# Patient Record
Sex: FEMALE | Race: WHITE
[De-identification: names, ages, dates, MRNs, and addresses within clinical notes are randomized per-mention and may not be internally consistent; named-entity substitution may affect disease eponyms.]

---

## 2018-02-16 ENCOUNTER — HOSPITAL ENCOUNTER (EMERGENCY)
Dept: HOSPITAL 25 - ED | Age: 83
Discharge: HOME | End: 2018-02-16
Payer: MEDICARE

## 2018-02-16 VITALS — SYSTOLIC BLOOD PRESSURE: 151 MMHG | DIASTOLIC BLOOD PRESSURE: 67 MMHG

## 2018-02-16 DIAGNOSIS — Z88.5: ICD-10-CM

## 2018-02-16 DIAGNOSIS — R51: ICD-10-CM

## 2018-02-16 DIAGNOSIS — I10: Primary | ICD-10-CM

## 2018-02-16 LAB
BASOPHILS # BLD AUTO: 0 10^3/UL (ref 0–0.2)
EOSINOPHIL # BLD AUTO: 0.2 10^3/UL (ref 0–0.6)
HCT VFR BLD AUTO: 40 % (ref 35–47)
HGB BLD-MCNC: 13.1 G/DL (ref 12–16)
INR PPP/BLD: 0.9 (ref 0.77–1.02)
LYMPHOCYTES # BLD AUTO: 2.1 10^3/UL (ref 1–4.8)
MCH RBC QN AUTO: 30 PG (ref 27–31)
MCHC RBC AUTO-ENTMCNC: 33 G/DL (ref 31–36)
MCV RBC AUTO: 89 FL (ref 80–97)
MONOCYTES # BLD AUTO: 0.5 10^3/UL (ref 0–0.8)
NEUTROPHILS # BLD AUTO: 3.6 10^3/UL (ref 1.5–7.7)
NRBC # BLD AUTO: 0 10^3/UL
NRBC BLD QL AUTO: 0
PLATELET # BLD AUTO: 243 10^3/UL (ref 150–450)
RBC # BLD AUTO: 4.43 10^6/UL (ref 4–5.4)
WBC # BLD AUTO: 6.5 10^3/UL (ref 3.5–10.8)

## 2018-02-16 PROCEDURE — 85610 PROTHROMBIN TIME: CPT

## 2018-02-16 PROCEDURE — 85730 THROMBOPLASTIN TIME PARTIAL: CPT

## 2018-02-16 PROCEDURE — 80053 COMPREHEN METABOLIC PANEL: CPT

## 2018-02-16 PROCEDURE — 96375 TX/PRO/DX INJ NEW DRUG ADDON: CPT

## 2018-02-16 PROCEDURE — 99283 EMERGENCY DEPT VISIT LOW MDM: CPT

## 2018-02-16 PROCEDURE — 96365 THER/PROPH/DIAG IV INF INIT: CPT

## 2018-02-16 PROCEDURE — 70450 CT HEAD/BRAIN W/O DYE: CPT

## 2018-02-16 PROCEDURE — 36415 COLL VENOUS BLD VENIPUNCTURE: CPT

## 2018-02-16 PROCEDURE — 85025 COMPLETE CBC W/AUTO DIFF WBC: CPT

## 2018-02-16 NOTE — ED
Vipul BRITO Stephanie, scribed for Neema Tavares MD on 02/16/18 at 0211 .





Headache





- HPI Summary


HPI Summary: 


The pt is an 81 y/o F presenting to the ED with c/o HA that began this 

afternoon. Symptoms include high blood pressure. The pt denies vomiting. The HA 

is rated as a 5 in severity. The HA feels better now than at onset. 








- History Of Current Complaint


Chief Complaint: EDHeadache


Stated Complaint: HEADACHE, HIGH BP


Time Seen by Provider: 02/16/18 02:08


Hx Obtained From: Patient


Onset/Duration: Gradual Onset, Started hours ago, Still Present


Currently Pain Is: Current Pain Scale(0-10)= - 5


Timing: Constant


Aggravating Factor: Nothing


Allevating Factors: Nothing





- Allergies/Home Medications


Allergies/Adverse Reactions: 


 Allergies











Allergy/AdvReac Type Severity Reaction Status Date / Time


 


codeine Allergy  Rash Verified 02/16/18 01:43














PMH/Surg Hx/FS Hx/Imm Hx


Endocrine/Hematology History: 


   Denies: Hx Diabetes


Cardiovascular History: Reports: Hx Hypertension, Hx Valvular Heart Disease - 

mitral valve replacement


   Denies: Hx Pacemaker/ICD


GI History: Reports: Hx Gastroesophageal Reflux Disease


 History: 


   Denies: Hx Renal Disease


Musculoskeletal History: 


   Denies: Hx Osteoporosis


Sensory History: 


   Denies: Hx Hearing Aid


Psychiatric History: 


   Denies: Hx Panic Disorder





- Cancer History


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


Hx Chemotherapy: No


Hx Radiation Therapy: No


Hx Palliative Cancer Treatment: No





- Surgical History


Surgery Procedure, Year, and Place: TONSILS AS CHILD.  MITRAL VALVE REPAIR-PT 

HAS CARDS THAT STATE MRI SAFE NO METAL 2014.  TUBAL LIGATION.  BILATERAL 

CATARACT SURGERY


Infectious Disease History: No


Infectious Disease History: 


   Denies: Traveled Outside the [place] in Last 30 Days





- Family History


Known Family History: Positive: Hypertension





- Social History


Occupation: Retired


Lives: With Family


Alcohol Use: None


Substance Use Type: Reports: None


Smoking Status (MU): Never Smoked Tobacco





Review of Systems


Negative: Fever


Negative: Vomiting


Positive: Headache


All Other Systems Reviewed And Are Negative: Yes





Physical Exam





- Summary


Physical Exam Summary: 


GENERAL:  Patient is a well-developed and nourished FEMALE who is lying 

comfortable in the stretcher. Patient is not in any acute respiratory distress.


HEAD AND FACE: No signs of trauma. No ecchymosis, hematomas or skull 

depressions. No sinus tenderness.


EYES: PERRLA, EOMI x 2, No injected conjunctiva, no nystagmus.


EARS: Hearing grossly intact. Ear canals and tympanic membranes are within 

normal limits.


MOUTH: Oropharynx within normal limits.


NECK: Supple, trachea is midline, no adenopathy, no JVD, no carotid bruit, no c-

spine tenderness, neck with full ROM.


CHEST: Symmetric, no tenderness at palpation


LUNGS: Clear to auscultation bilaterally. No wheezing or crackles.


CVS: Regular rate and rhythm, S1 and S2 present, no murmurs or gallops 

appreciated.


ABDOMEN: Soft, non-tender. No signs of distention. No rebound no guarding, and 

no masses palpated. Bowel sounds are normal.


EXTREMITIES: FROM in all major joints, no edema, no cyanosis or clubbing.


NEURO: Alert and oriented x 3. No acute neurological deficits. Speech is normal 

and follows commands.


SKIN: Dry and warm








Triage Information Reviewed: Yes


Vital Signs On Initial Exam: 


 Initial Vitals











Temp Pulse Resp BP Pulse Ox


 


 97.9 F   91   16   179/85   96 


 


 02/16/18 01:40  02/16/18 01:40  02/16/18 01:40  02/16/18 01:40  02/16/18 01:40











Vital Signs Reviewed: Yes





Diagnostics





- Vital Signs


 Vital Signs











  Temp Pulse Resp BP Pulse Ox


 


 02/16/18 01:40  97.9 F  91  16  179/85  96














- Laboratory


Result Diagrams: 


 02/16/18 02:10





 02/16/18 02:10


Lab Statement: Any lab studies that have been ordered have been reviewed, and 

results considered in the medical decision making process.





- CT


  ** Brain


CT Interpretation: Positive (See Comments)


CT Interpretation Completed By: Radiologist - Lacunar infarction in the left 

internal capsule appears chronic. Comparison to prior studies is recommended.





Headache Course/Dx





- Course


Course Of Treatment: The pt is an 81 y/o F presenting to the ED with c/o HA 

that began earlier this evening with elevated BP. The pt was given Labetalol 10 

mg IV. The pt's BP has decreased, and the pt's HA is better. She will be 

dischrged home.





- Diagnoses


Provider Diagnoses: 


 Hypertension, Headache








Discharge





- Discharge Plan


Condition: Stable


Disposition: HOME


Patient Education Materials:  Migraine Headache (ED), Hypertension (ED)


Referrals: 


Parvin Bazzi MD [Primary Care Provider] - 3 Days


Additional Instructions: 


You were given Labetalol 10 mg IV in the ED. 


RETURN TO EMERGENCY DEPARTMENT FOR ANY NEW OR WORSENING SYMPTOMS 





The documentation as recorded by the Vipul mosley Stephanie accurately reflects 

the service I personally performed and the decisions made by me, Neema Tavares MD.

## 2018-02-16 NOTE — RAD
HISTORY: Headache



COMPARISONS: MRI of the brain dated October 03, 2016



TECHNIQUE: Multiple contiguous axial CT scans were obtained of the head without 

intravenous contrast. 



FINDINGS: 

HEMORRHAGE/INFARCT: There is no hemorrhage or acute infarct.

MASSES/SHIFT: There is no mass or shift.



EXTRA-AXIAL SPACES: There are no extra-axial fluid collections.

SULCI AND VENTRICLES: The sulci and ventricles are normal in size and position for the

patient's stated age.



CEREBRUM: There is a chronic lacunar infarct of the left basal ganglia.

BRAINSTEM: There are no focal parenchymal abnormalities.

CEREBELLUM: There are no focal parenchymal abnormalities.



VESSELS: The vessels are grossly normal.

PARANASAL SINUSES: The paranasal sinuses are clear.

ORBITS: The orbits are unremarkable.

BONES AND SOFT TISSUE: No bone or soft tissue abnormalities are noted.



OTHER: None



IMPRESSION: 

1.  NO ACUTE INTRACRANIAL PATHOLOGY.

2.  CHRONIC LACUNAR INFARCT OF THE LEFT BASAL GANGLIA.

## 2018-05-01 NOTE — HP
HISTORY AND PHYSICAL:

 

DATE OF SURGERY:  05/10/18

 

DATE OF OFFICE VISIT:  04/30/18

 

SURGEON:  Natasha Morris MD * (DICTATED BY MELIZA CRUZ)

 

PROCEDURE:  Left total knee arthroplasty.

 

CHIEF COMPLAINT:  Left knee pain.

 

HISTORY OF PRESENT ILLNESS:  Ms. Sneed is an 82-year-old female with 
complaints of left knee pain.  She has failed conservative management and 
elected to proceed with a left total knee arthroplasty which is scheduled for 05
/10/18 with Dr. Morris.

 

PAST MEDICAL HISTORY:

1.  Hypertension.

2.  History of a leaky heart valve.

3.  History of melanoma.

 

PAST SURGICAL HISTORY:

1.  Heart valve repair.

2.  Tonsillectomy.

3.  Cataract removal.

 

CURRENT MEDICATIONS:

1.  Evista 60 mg daily.

2.  Hydrochlorothiazide 25 mg daily.

3.  Nasonex as needed.

4.  Omeprazole 20 mg daily.

5.  Senior multivitamin.

6.  Glucosamine chondroitin.

7.  Zyrtec for allergies.

8.  Ibuprofen.

9.  Metoprolol 25 mg twice a day.

10.  Aspirin 325 every day.

11.  Valsartan 80 mg daily.

12.  Citrucel.

 

ALLERGIES:  None.

 

FAMILY HISTORY:  Parkinson's disease, cancer and coronary artery disease.

 

SOCIAL HISTORY:  She is an 82-year-old female.  She lives with her .  
She does not smoke or use drugs.  She uses occasional alcohol.

 

REVIEW OF SYSTEMS:  A complete 14-point review of systems was reviewed with the 
patient, it was all negative.  She denies history of DVT, PE, hepatitis, HIV, 
or anesthesia problems.

 

                               PHYSICAL EXAMINATION

 

GENERAL:  She is well developed, well nourished, in no acute distress.

 

VITAL SIGNS:  She stands 5 feet 3 inches tall, weighs 156 pounds.  Her blood 
pressure is 160/90 and heart rate is 64.

 

HEENT:  Normocephalic, atraumatic.

 

NECK:  Supple.  No palpable lymph nodes.

 

PULMONARY:  The lungs are clear to auscultation bilaterally.

 

CARDIO:  Regular rate and rhythm.  Strong S1 and S2.

 

ABDOMEN:  Soft, nontender, and nondistended.

 

NEUROLOGICAL:  She is alert and oriented x3.  Cranial nerves II through XII are 
intact.

 

MUSCULOSKELETAL:  Left lower extremity, the skin is intact.  There is no open 
wounds or abrasions.  She has a moderate joint effusion and tenderness over the 
medial and lateral joint line.  Range of motion 5 to 120 degrees with 
patellofemoral crepitus.  She has 2+ dorsalis pedis pulses.  Intact sensation 
to lower extremities.  Muscle group strengths are intact at 5/5.

 

 ASSESSMENT AND PLAN:  Ms. Sneed is an 82-year-old female with end-stage 
osteoarthritis of the left knee.  She has failed conservative management and 
elected to proceed with a left total knee arthroplasty which is scheduled for 05
/10/18 with Dr. Morris.  Dr. Morris discussed the risks and benefits of surgery 
at today's visit and all of her questions were answered.  She will follow up 
with Dr. Morris 2 weeks after the surgery.

 

 ____________________________________ MELIZA CRUZ

 

[id number]/CPS #: [id number]

MTDD

## 2018-05-10 ENCOUNTER — HOSPITAL ENCOUNTER (INPATIENT)
Dept: HOSPITAL 25 - AA | Age: 83
LOS: 2 days | Discharge: HOME HEALTH | DRG: 470 | End: 2018-05-12
Attending: ORTHOPAEDIC SURGERY | Admitting: ORTHOPAEDIC SURGERY
Payer: MEDICARE

## 2018-05-10 DIAGNOSIS — M81.0: ICD-10-CM

## 2018-05-10 DIAGNOSIS — K21.9: ICD-10-CM

## 2018-05-10 DIAGNOSIS — Z88.5: ICD-10-CM

## 2018-05-10 DIAGNOSIS — Z98.49: ICD-10-CM

## 2018-05-10 DIAGNOSIS — I27.20: ICD-10-CM

## 2018-05-10 DIAGNOSIS — M25.762: ICD-10-CM

## 2018-05-10 DIAGNOSIS — Z72.89: ICD-10-CM

## 2018-05-10 DIAGNOSIS — M85.862: ICD-10-CM

## 2018-05-10 DIAGNOSIS — E78.00: ICD-10-CM

## 2018-05-10 DIAGNOSIS — I95.1: ICD-10-CM

## 2018-05-10 DIAGNOSIS — Z82.0: ICD-10-CM

## 2018-05-10 DIAGNOSIS — I10: ICD-10-CM

## 2018-05-10 DIAGNOSIS — Z79.82: ICD-10-CM

## 2018-05-10 DIAGNOSIS — Z95.2: ICD-10-CM

## 2018-05-10 DIAGNOSIS — M17.12: Primary | ICD-10-CM

## 2018-05-10 DIAGNOSIS — Z80.9: ICD-10-CM

## 2018-05-10 DIAGNOSIS — Z85.820: ICD-10-CM

## 2018-05-10 DIAGNOSIS — Z91.040: ICD-10-CM

## 2018-05-10 DIAGNOSIS — Z98.51: ICD-10-CM

## 2018-05-10 DIAGNOSIS — Z79.01: ICD-10-CM

## 2018-05-10 DIAGNOSIS — Z82.49: ICD-10-CM

## 2018-05-10 DIAGNOSIS — I07.1: ICD-10-CM

## 2018-05-10 PROCEDURE — 85610 PROTHROMBIN TIME: CPT

## 2018-05-10 PROCEDURE — C1776 JOINT DEVICE (IMPLANTABLE): HCPCS

## 2018-05-10 PROCEDURE — 0SRD0J9 REPLACEMENT OF LEFT KNEE JOINT WITH SYNTHETIC SUBSTITUTE, CEMENTED, OPEN APPROACH: ICD-10-PCS | Performed by: ORTHOPAEDIC SURGERY

## 2018-05-10 PROCEDURE — 93005 ELECTROCARDIOGRAM TRACING: CPT

## 2018-05-10 PROCEDURE — 85018 HEMOGLOBIN: CPT

## 2018-05-10 PROCEDURE — 80048 BASIC METABOLIC PNL TOTAL CA: CPT

## 2018-05-10 PROCEDURE — 36415 COLL VENOUS BLD VENIPUNCTURE: CPT

## 2018-05-10 PROCEDURE — 85025 COMPLETE CBC W/AUTO DIFF WBC: CPT

## 2018-05-10 PROCEDURE — 88305 TISSUE EXAM BY PATHOLOGIST: CPT

## 2018-05-10 PROCEDURE — 85049 AUTOMATED PLATELET COUNT: CPT

## 2018-05-10 PROCEDURE — 85014 HEMATOCRIT: CPT

## 2018-05-10 PROCEDURE — 88311 DECALCIFY TISSUE: CPT

## 2018-05-10 RX ADMIN — CEFAZOLIN SODIUM SCH MLS/HR: 1 SOLUTION INTRAVENOUS at 23:21

## 2018-05-10 RX ADMIN — MAGNESIUM HYDROXIDE SCH: 400 SUSPENSION ORAL at 23:35

## 2018-05-10 RX ADMIN — METOPROLOL TARTRATE SCH: 50 TABLET, FILM COATED ORAL at 23:36

## 2018-05-10 RX ADMIN — DOCUSATE SODIUM SCH MG: 100 CAPSULE, LIQUID FILLED ORAL at 23:24

## 2018-05-10 NOTE — CONS
CC:  Dr. Natasha Morris; Dr. Reyes  *

 

CONSULTATION REPORT:

 

DATE OF CONSULT:  05/10/18.

 

CONSULTING PROVIDER:  Dr. Natasha Morrsi.

 

MY ATTENDING WHILE IN THE HOSPITAL:  Dr. Callie Fung.

 

PRIMARY CARE PROVIDER:  Dr. Reyes.

 

REASON FOR CONSULTATION:  Co-management of comorbid medical conditions.

 

HISTORY OF PRESENT ILLNESS:  Ms. Sneed is an 83-year-old female with past 
medical history significant for severe mitral regurgitation status post 
annuloplasty in , now with mild-to-moderate tricuspid regurgitation on 
recent echo with diastolic dysfunction as well as hypertension, who is status 
post left total knee arthroplasty.  The patient is examined in the 
postoperative setting and has no pain, but she has no feeling in her legs.  She 
is regaining movement in her right leg, but still is unable to move her left.  
The patient has spinal anesthesia and a nerve block on her left side.  The 
patient's EBL in surgery was 250.  The patient denies chest pain, shortness of 
breath, nausea, vomiting, dizziness, palpitations, abdominal pain, change in 
vision.  The patient denies any recent illnesses, urinary tract infection.  The 
patient has been having swollen legs since her mitral annuloplasty in , 
which resolves with recumbency.  The patient denies any dyspnea on exertion.  
The patient has decreased exercise tolerance only related to her left knee 
pain.  The patient has been having issues with her blood pressure and recently 
had her valsartan and metoprolol increased.  The patient held her aspirin 325 
mg for 7 days and her valsartan and hydrochlorothiazide this morning.

 

PAST MEDICAL HISTORY:  Hypertension, mitral valve regurgitation, status post 
annuloplasty, history of melanoma on her stomach removed at age 67, grade 2 
diastolic dysfunction on her most recent echocardiogram.

 

PAST SURGICAL HISTORY:  Mitral valve annuloplasty in  , tonsillectomy, 
cataract removal, and ganglion cystectomy.

 

CURRENT MEDICATIONS:

1.  Evista 60 mg p.o. daily.

2.  Hydrochlorothiazide 25 mg p.o. daily.

3.  Nasonex 15 mcg both nares as needed.

4.  Omeprazole 20 mg p.o. daily.

5.  Senior multivitamins one tab p.o. daily.

6.  Glucosamine chondroitin daily.

7.  Zyrtec 10 mg p.o. daily as needed for allergies.

8.  Ibuprofen as needed for pain.

9.  Metoprolol tartrate 50 mg twice daily.

10.  Aspirin 325 mg p.o. daily.

11.  Valsartan 160 mg p.o. daily.

12.  Citrucel.

13.  Magnesium oxide 250 mg p.o. daily.

 

ALLERGIES:  The patient is allergic to CODEINE and LATEX.

 

FAMILY HISTORY:  The patient's father  of skin cancer.  The patient's 
mother  of Alzheimer's disease and also had atrial fibrillation and CAD.  
The patient's brother  of skin cancer.  The patient's grandfather, 
grandmother, and additional grandfather were all  due to coronary 
artery disease.  The patient's children are in good health.

 

SOCIAL HISTORY:  The patient never smoked or used illicit drugs.  The patient 
drinks 2 to 3 glasses of wine a week.  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  The 
patient's surrogate decision maker will be her , Jose E Sneed.

 

REVIEW OF SYSTEMS:  A 14-point review of systems was reviewed and is negative 
except as above.

 

PHYSICAL EXAM:  General:  The patient is an 83-year-old female, who appears 
stated age and sitting comfortably in bed, in no acute distress.  Vital Signs: 
Temperature 97.2, pulse rate 75, respiratory rate 18, oxygen saturation 96% on 
room air, blood pressure 188/89.  HEENT:  Head, normocephalic, atraumatic.  
Sclerae anicteric.  No conjunctival injection.  Nasal mucosa moist.  Oral 
mucosa moist.  No pharyngeal erythema, discharge or exudate.  Neck:  Supple, 
nontender.  No lymphadenopathy.  No carotid bruits auscultated. No JVD.  Cardiac
:  Regular rate and rhythm.  No clicks, murmurs, gallops or rubs. Pulses are 2+ 
in the bilateral dorsalis pedis, posterior tibialis, and radial areas.  No calf 
tenderness noted. Respiratory:  Clear to auscultation bilaterally.  No wheezes, 
rales or rhonchi. Good air exchange bilaterally.  Abdomen:  Soft, nontender, 
nondistended.  Bowel sounds present and normoactive in all 4 quadrants.  No 
hepatosplenomegaly.  No abdominal bruits auscultated.  Genitourinary:  No 
suprapubic or CVA tenderness. Carreno in place, draining clear yellow urine.  
Neuro:  Cranial nerves II through XII intact.  Strength preserved in bilateral 
upper extremities.  The patient is able to move her right lower extremities 
with decreased strength.  The patient has no sensation in this extremity.  The 
patient is not able to move or feel anything in her left lower extremity.  
Alert and oriented x3.  Psychiatric:  Pleasant and cooperative.

 

DIAGNOSTIC STUDIES/LAB DATA:  Laboratory data from preoperative testing; white 
blood cell count 8.7, hemoglobin 14.3, hematocrit 43, and platelet count 281.  
INR 0.87, PTT 29.5.  Sodium 132, potassium 4.0, chloride 97, carbon dioxide 31, 
anion gap 4, BUN 21, creatinine 0.69, glucose 86, calcium 9.6.  Bilirubin 0.4, 
AST 20, ALT 15, alkaline phosphatase 36, protein 7.5, albumin 4.1, globulin 3.4.

 

IMPRESSION AND PLAN:  Ms. Sneed is an 83-year-old female with past medical 
history significant for mitral valve regurgitation, hypertension, diastolic 
dysfunction on echocardiogram, who is status post left total knee arthroplasty 
and is recovering well.  The patient is markedly hypertensive at this time.

 

1.  Postoperative state management per Orthopedics.  The patient has no pain at 
this time.  The patient's pain will be controlled with Percocet, oxycodone, and 
morphine as needed.  The patient will have her hemoglobin and hematocrit 
monitored, bowel regimen per primary team, Carreno should be removed.

2.  Hypertension.  The patient is currently markedly hypertensive.  We will 
prescribe hydralazine as needed for high blood pressure.  We will also give the 
patient a half dose of her daily valsartan, which she did not take this morning 
to help to control her blood pressure.  We will resume valsartan tomorrow 
morning. Continue metoprolol and hold hydrochlorothiazide at this point.  This 
will be re- introduced as indicated as early as tomorrow morning.

3.  Mitral valve regurgitation.  The patient has swollen legs and no other sign 
of congestive heart failure.  The patient did not have dyspnea on exertion.  
The patient was recently cleared for surgery by her cardiologist, Dr. Cheikh Cruz. Due to hypertension and mitral valve regurgitation as well as diastolic 
dysfunction evidenced on echocardiogram, the patient will be relatively fluid 
sensitive and her postoperative fluids have been decreased to 75 mL an hour.  
The patient should monitor closely for her respiratory status.

4.  FEN.  The patient will have heart healthy diet without caffeine and fluids 
as above.

5.  DVT prophylaxis.  The patient will have Lovenox to warfarin per primary 
team protocol.

6.  Code status.  The patient would like to be a full code.  The patient's 
surrogate decision maker is her , Jose E Sneed as above.

6.  Disposition.  The patient is inpatient, disposition per Ortho.

 

TIME SPENT:  Approximately 60 minutes was spent on this consultation, 30 of 
which was spent face-to-face with the patient obtaining history and physical 
and discussing the treatment plan.  This plan has been discussed with my 
attending, Dr. Callie Fung, and she is in agreement.

 

____________________________________ MELIZA VALLE

 

[id number]/CPS #: [id number]

LUKASZ

## 2018-05-10 NOTE — RAD
Indication: Left knee replacement.



2 views of left knee demonstrates left knee replacement in satisfactory position. No

loosening is noted.



IMPRESSION: Left knee replacement in satisfactory position.

## 2018-05-11 LAB
BASOPHILS # BLD AUTO: 0 10^3/UL (ref 0–0.2)
EOSINOPHIL # BLD AUTO: 0 10^3/UL (ref 0–0.6)
HCT VFR BLD AUTO: 33 % (ref 35–47)
HCT VFR BLD AUTO: 34 % (ref 35–47)
HGB BLD-MCNC: 10.9 G/DL (ref 12–16)
HGB BLD-MCNC: 11.2 G/DL (ref 12–16)
INR PPP/BLD: 1.01 (ref 0.77–1.02)
LYMPHOCYTES # BLD AUTO: 0.6 10^3/UL (ref 1–4.8)
MCH RBC QN AUTO: 30 PG (ref 27–31)
MCHC RBC AUTO-ENTMCNC: 33 G/DL (ref 31–36)
MCV RBC AUTO: 89 FL (ref 80–97)
MONOCYTES # BLD AUTO: 0.5 10^3/UL (ref 0–0.8)
NEUTROPHILS # BLD AUTO: 13.7 10^3/UL (ref 1.5–7.7)
NRBC # BLD AUTO: 0 10^3/UL
NRBC BLD QL AUTO: 0
PLATELET # BLD AUTO: 215 10^3/UL (ref 150–450)
PLATELET # BLD AUTO: 222 10^3/UL (ref 150–450)
RBC # BLD AUTO: 3.75 10^6/UL (ref 4–5.4)
WBC # BLD AUTO: 15 10^3/UL (ref 3.5–10.8)

## 2018-05-11 RX ADMIN — MAGNESIUM OXIDE TAB 400 MG (241.3 MG ELEMENTAL MG) SCH MG: 400 (241.3 MG) TAB at 09:06

## 2018-05-11 RX ADMIN — OMEPRAZOLE SCH MG: 20 CAPSULE, DELAYED RELEASE ORAL at 09:06

## 2018-05-11 RX ADMIN — METOPROLOL TARTRATE SCH MG: 50 TABLET, FILM COATED ORAL at 09:06

## 2018-05-11 RX ADMIN — MAGNESIUM HYDROXIDE SCH ML: 400 SUSPENSION ORAL at 09:07

## 2018-05-11 RX ADMIN — DOCUSATE SODIUM SCH MG: 100 CAPSULE, LIQUID FILLED ORAL at 19:46

## 2018-05-11 RX ADMIN — CEFAZOLIN SODIUM SCH MLS/HR: 1 SOLUTION INTRAVENOUS at 13:44

## 2018-05-11 RX ADMIN — OXYCODONE HYDROCHLORIDE PRN MG: 5 CAPSULE ORAL at 01:25

## 2018-05-11 RX ADMIN — ACETAMINOPHEN PRN MG: 325 TABLET ORAL at 09:23

## 2018-05-11 RX ADMIN — ACETAMINOPHEN PRN MG: 325 TABLET ORAL at 22:38

## 2018-05-11 RX ADMIN — ACETAMINOPHEN PRN MG: 325 TABLET ORAL at 13:40

## 2018-05-11 RX ADMIN — THERA TABS SCH TAB: TAB at 09:06

## 2018-05-11 RX ADMIN — ENOXAPARIN SODIUM SCH MG: 30 INJECTION SUBCUTANEOUS at 12:44

## 2018-05-11 RX ADMIN — CEFAZOLIN SODIUM SCH MLS/HR: 1 SOLUTION INTRAVENOUS at 06:18

## 2018-05-11 RX ADMIN — METOPROLOL TARTRATE SCH MG: 50 TABLET, FILM COATED ORAL at 19:46

## 2018-05-11 RX ADMIN — ACETAMINOPHEN PRN MG: 325 TABLET ORAL at 18:40

## 2018-05-11 RX ADMIN — MAGNESIUM HYDROXIDE SCH ML: 400 SUSPENSION ORAL at 19:46

## 2018-05-11 RX ADMIN — DOCUSATE SODIUM SCH MG: 100 CAPSULE, LIQUID FILLED ORAL at 09:06

## 2018-05-11 RX ADMIN — OXYCODONE HYDROCHLORIDE PRN MG: 5 CAPSULE ORAL at 06:28

## 2018-05-11 NOTE — PN
Subjective


Date of Service: 05/11/18


Interval History: 





Patient was seen and examined earlier this AM. Events noted form last night, a 

syncopal episode likely secondary to orthostatic hypotension. Patient is 

feeling much better this AM. She ate her breakfast, denies any complaints. No 

chest pain, headaches, dizziness, dyspnea or SOB. Labs and EKG reviewed, no 

changes or concerns for hemodynamics. Seen earlier by , Carreno catheter 

and PEDRO drain removed. Awaiting PT eval to ambulate, anticipate to stay 

inpatient today. 








Family History: Unchanged from Admission


Social History: Unchanged from Admission


Past Medical History: Unchanged from Admission





Objective


Active Medications: 








Acetaminophen (Tylenol Tab*)  650 mg PO Q4H PRN


   PRN Reason: PAIN OR TEMPERATURE


   Last Admin: 05/11/18 13:40 Dose:  650 mg


Bisacodyl (Dulcolax Supp*)  10 mg MO DAILY PRN


   PRN Reason: constipation


Cyclobenzaprine HCl (Flexeril Tab*)  5 mg PO TID PRN


   PRN Reason: SPASMS


Diphenhydramine HCl (Benadryl Iv*)  12.5 mg IV Q6H PRN


   PRN Reason: PRURITIS


Diphenhydramine HCl (Benadryl Liq*)  12.5 mg PO Q6H PRN


   PRN Reason: itching


Docusate Sodium (Colace Cap*)  100 mg PO BID [facility]


   Last Admin: 05/11/18 09:06 Dose:  100 mg


Enoxaparin Sodium (Lovenox(*))  30 mg SUBCUT Q24H [facility]


   Last Admin: 05/11/18 12:44 Dose:  30 mg


Lactated Ringer's (Lactated Ringers 1000 Ml Bag*)  1,000 mls @ 75 mls/hr IV PER 

RATE [facility]


   Last Admin: 05/10/18 20:04 Dose:  75 mls/hr


Lactulose (Lactulose*)  30 ml PO Q6H PRN


   PRN Reason: constipation


Magnesium Hydroxide (Milk Of Magnesia Liq*)  30 ml PO BID [facility]


   Last Admin: 05/11/18 09:07 Dose:  30 ml


Magnesium Hydroxide (Milk Of Magnesia Liq*)  30 ml PO Q6H PRN


   PRN Reason: constipation


Magnesium Oxide (Magox 400 Tab*)  400 mg PO DAILY [facility]


   Last Admin: 05/11/18 09:06 Dose:  400 mg


Metoprolol Tartrate (Lopressor Tab*)  50 mg PO BID [facility]


   Last Admin: 05/11/18 09:06 Dose:  50 mg


Morphine Sulfate (Morphine Vial*)  2 mg IV Q2H PRN


   PRN Reason: PAIN


Multivitamins (Theragran Tab*)  1 tab PO DAILY [facility]


   Last Admin: 05/11/18 09:06 Dose:  1 tab


Omeprazole (Prilosec Cap*)  20 mg PO DAILY [facility]


   Last Admin: 05/11/18 09:06 Dose:  20 mg


Ondansetron HCl (Zofran Inj*)  4 mg IV Q6H PRN


   PRN Reason: nausea


Ondansetron HCl (Zofran Tab*)  4 mg PO Q6H PRN


   PRN Reason: NAUSEA


Oxycodone HCl (Roxycodone Tab*)  10 mg PO Q4H PRN


   PRN Reason: pain


   Last Admin: 05/11/18 06:28 Dose:  5 mg


Oxycodone/Acetaminophen (Percocet 5/325 Tab*)  2 tab PO Q4H PRN


   PRN Reason: PAIN


   Last Admin: 05/11/18 04:14 Dose:  2 tab


Oxycodone/Acetaminophen (Percocet 5/325 Tab*)  1 tab PO Q4H PRN


   PRN Reason: PAIN


   Last Admin: 05/10/18 23:24 Dose:  1 tab


Pharmacy Profile Note (Coumadin Daily Reminder*)  1 note FOLLOW UP 1700 [facility]


   Last Admin: 05/11/18 17:07 Dose:  1 note


Polyethylene Glycol/Electrolytes (Miralax*)  17 gm PO DAILY PRN


   PRN Reason: Constipation


Temazepam (Restoril Cap*)  15 mg PO BEDTIME PRN


   PRN Reason: INSOMNIA








 Vital Signs - 8 hr











  05/11/18 05/11/18 05/11/18





  11:32 15:28 16:00


 


Temperature 97.4 F 97.9 F 


 


Pulse Rate 94 89 


 


Respiratory 16 16 





Rate   


 


Blood Pressure 117/43 131/44 





(mmHg)   


 


O2 Sat by Pulse 93 97 93





Oximetry   











Oxygen Devices in Use Now: None


Appearance: Comfortable in bed, finished her breakfast, in NAD.


Eyes: No Scleral Icterus, PERRLA


Ears/Nose/Mouth/Throat: Clear Oropharnyx, Mucous Membranes Moist


Neck: NL Appearance and Movements; NL JVP, Trachea Midline


Respiratory: Symmetrical Chest Expansion and Respiratory Effort, Clear to 

Auscultation


Cardiovascular: NL Sounds; No Murmurs; No JVD, RRR


Abdominal: NL Sounds; No Tenderness; No Distention


Extremities: No Edema, - - LLE with clean and dry ACE dressing. No swelling or 

ecchymosis noted. Sensation intact.


Skin: No Rash or Ulcers


Neurological: Alert and Oriented x 3, NL Sensation, NL Muscle Strength and Tone


Lines/Tubes/Other Access: Clean, Dry and Intact Peripheral IV


Nutrition: Taking PO's


Result Diagrams: 


 05/11/18 05:24





 05/11/18 05:24


Additional Lab and Data: 


.


Microbiology and Other Data: 


.


Diagnostic Imaging: 


.


EKG Data: 


EKG INTERPRETATION








ECG Report 


 


 Patient Name ANY VALERIO


 Birthdate 1935


 Sex F


 Medical Record Number [id number]


 Account Number [id number]


 Order Number [id number]


 Date of ECG 05/11/2018 01:50:12


 Interpretation 


 --------------------------


Sinus rhythm.normal P axis, V-rate  60- 99 


- NORMAL ECG -


NO SIGNIFICANT CHANGES SINCE THE PREVIOUS RECORD OF 12 Lead 08/29/2016 23:01:30


Electronically signed on 05/11/2018 at 07:33 by Yariel Bradford MD














Assess/Plan/Problems-Billing


Assessment: 





An 84 y/o female with PMH of HTN, mitral valve regurgitation and distolic 

dysfunction, who is POD#1, s/p left total knee arthroplasty, doing better now, 

after a syncopal episode last night secondary to orthostatic hypotension.











- Patient Problems


(1) Knee joint replacement status


Current Visit: Yes   Status: Acute   Priority: High   Comment: 


- Management per ortho


- PT/OT eval, consider STR at SNF on Monday


- Pain well controlled


   





(2) Hypertension


Current Visit: Yes   Status: Acute   Comment: 


- Hypertensive intially immediate post-op period and PACU, then hypotensive 

rebound likely due to spinal anasthetic and narcotics


- BP appears well controlled this AM


- Continue Metoprolol, hold HCTZ


   





(3) Syncope


Current Visit: Yes   Status: Acute   Priority: High   Comment: 


- Likely secondary to orthostatic hypotension


- H/H rechecked last night, hemodynamically stable


- Will continue to monitor


   





(4) Orthostatic hypotension


Current Visit: Yes   Status: Acute   Comment: 


- Resolved


   





(5) Mitral regurgitation


Current Visit: Yes   Status: Acute   Comment: 


- Stable


   





(6) DVT (deep venous thrombosis)


Current Visit: Yes   Status: Acute   Comment: 


- Lovenox to Warfarin per ortho team protocol


   





(7) Full code status


Current Visit: Yes   Status: Acute   


Status and Disposition: 





Inpatient. Anticipate discharge to home on PT, or STR at SNF when medically 

stable.

## 2018-05-11 NOTE — PN
Progress Note





- Progress Note


Date of Service: 05/11/18


SOAP: 


Subjective:


84 y/o female s/p L TKA by DR Morris 5/10/2018.  Patient with syncopal episode 

overnight, no improved, no lightheaded/ dizziness.  Decreasing narcotic use.   

VSS, afebrile overnight.  Would like to return home.   





Objective:


General- Well appearing, NAD, AO SItting in chair comfortably  


MSK- L LE- DF/PF = b/l, PT 2+, negative homans sign, Surgical dressing intact, 

no drainage noted, drain removed by Dr. Morris in AM.  SITLT.  





 Vital Signs











Temp  98.2 F   05/11/18 07:35


 


Pulse  104   05/11/18 07:35


 


Resp  14   05/11/18 07:35


 


BP  142/56   05/11/18 07:35


 


Pulse Ox  93   05/11/18 07:35








 Intake & Output











 05/10/18 05/11/18 05/11/18





 18:59 06:59 18:59


 


Intake Total 2700 1750 


 


Output Total 1625 695 


 


Balance 1075 1055 


 


Weight 70.942 kg  


 


Intake:   


 


  IV Fluids 2400 500 


 


    LR 2400  


 


    NS (0.9%)  500 


 


  Oral 300 1250 


 


Output:   


 


  Carreno 1625 695 














Assessment:


Stable 84 y/o female s/p L TKA by DR Morris 5/10/2018.








Plan:


- DVT prophylaxis- lovenox, coumadin- 6mg tonight.  


- Continue PT/ OT 


- Follow up with Dr. Morris within 10-14 days 


- H&H - stable 


- post-op IV ABX - RUnning 


- Patient would like D/C to home.   


- Hospitalists following for cards, syncopal episodes.   














Acetaminophen (Tylenol Tab*)  650 mg PO Q4H PRN


   PRN Reason: PAIN OR TEMPERATURE


   Last Admin: 05/11/18 09:23 Dose:  650 mg


Bisacodyl (Dulcolax Supp*)  10 mg [place] DAILY PRN


   PRN Reason: constipation


Cyclobenzaprine HCl (Flexeril Tab*)  5 mg PO TID PRN


   PRN Reason: SPASMS


Diphenhydramine HCl (Benadryl Iv*)  12.5 mg IV Q6H PRN


   PRN Reason: PRURITIS


Diphenhydramine HCl (Benadryl Liq*)  12.5 mg PO Q6H PRN


   PRN Reason: itching


Docusate Sodium (Colace Cap*)  100 mg PO BID [facility]


   Last Admin: 05/11/18 09:06 Dose:  100 mg


Enoxaparin Sodium (Lovenox(*))  30 mg SUBCUT Q24H [facility]


Cefazolin Sodium/Dextrose (Kefzol 1 Gm In Dextrose Duplex (*))  1 gm in 50 mls 

@ 200 mls/hr IVPB Q8H [facility]


   Stop: 05/11/18 14:14


   Last Admin: 05/11/18 06:18 Dose:  200 mls/hr


Lactated Ringer's (Lactated Ringers 1000 Ml Bag*)  1,000 mls @ 75 mls/hr IV PER 

RATE [facility]


   Last Admin: 05/10/18 20:04 Dose:  75 mls/hr


Lactulose (Lactulose*)  30 ml PO Q6H PRN


   PRN Reason: constipation


Magnesium Hydroxide (Milk Of Magnesia Liq*)  30 ml PO BID [facility]


   Last Admin: 05/11/18 09:07 Dose:  30 ml


Magnesium Hydroxide (Milk Of Magnesia Liq*)  30 ml PO Q6H PRN


   PRN Reason: constipation


Magnesium Oxide (Magox 400 Tab*)  400 mg PO DAILY [facility]


   Last Admin: 05/11/18 09:06 Dose:  400 mg


Metoprolol Tartrate (Lopressor Tab*)  50 mg PO BID [facility]


   Last Admin: 05/11/18 09:06 Dose:  50 mg


Morphine Sulfate (Morphine Vial*)  2 mg IV Q2H PRN


   PRN Reason: PAIN


Multivitamins (Theragran Tab*)  1 tab PO DAILY [facility]


   Last Admin: 05/11/18 09:06 Dose:  1 tab


Naloxone HCl (Narcan*)  0.08 mg IV Q2M PRN


   PRN Reason: severe induced resp depression


   Stop: 05/11/18 14:13


Omeprazole (Prilosec Cap*)  20 mg PO DAILY [facility]


   Last Admin: 05/11/18 09:06 Dose:  20 mg


Ondansetron HCl (Zofran Inj*)  4 mg IV Q6H PRN


   PRN Reason: nausea


Ondansetron HCl (Zofran Tab*)  4 mg PO Q6H PRN


   PRN Reason: NAUSEA


Oxycodone HCl (Roxycodone Tab*)  10 mg PO Q4H PRN


   PRN Reason: pain


   Last Admin: 05/11/18 06:28 Dose:  5 mg


Oxycodone/Acetaminophen (Percocet 5/325 Tab*)  2 tab PO Q4H PRN


   PRN Reason: PAIN


   Last Admin: 05/11/18 04:14 Dose:  2 tab


Oxycodone/Acetaminophen (Percocet 5/325 Tab*)  1 tab PO Q4H PRN


   PRN Reason: PAIN


   Last Admin: 05/10/18 23:24 Dose:  1 tab


Pharmacy Profile Note (Coumadin Daily Reminder*)  1 note FOLLOW UP 1700 MIKE


Polyethylene Glycol/Electrolytes (Miralax*)  17 gm PO DAILY PRN


   PRN Reason: Constipation


Temazepam (Restoril Cap*)  15 mg PO BEDTIME PRN


   PRN Reason: INSOMNIA


Warfarin Sodium (Coumadin Tab(*))  6 mg PO ONCE@1700 ONE


   PRN Reason: Protocol


   Stop: 05/11/18 17:01





 Laboratory Results - last 24 hr











  05/11/18 05/11/18 05/11/18





  01:20 01:20 05:24


 


WBC  15.0 H  


 


RBC  3.75 L  


 


Hgb  11.2 L   10.9 L


 


Hct  34 L   33 L


 


MCV  89  


 


MCH  30  


 


MCHC  33  


 


RDW  14  


 


Plt Count  222   215


 


MPV  8.5   8.8


 


Neut % (Auto)  91.9 H  


 


Lymph % (Auto)  4.3 L  


 


Mono % (Auto)  3.6  


 


Eos % (Auto)  0  


 


Baso % (Auto)  0.2  


 


Absolute Neuts (auto)  13.7 H  


 


Absolute Lymphs (auto)  0.6 L  


 


Absolute Monos (auto)  0.5  


 


Absolute Eos (auto)  0  


 


Absolute Basos (auto)  0  


 


Absolute Nucleated RBC  0  


 


Nucleated RBC %  0  


 


INR (Anticoag Therapy)   


 


Sodium   132 L 


 


Potassium   3.9 


 


Chloride   100 L 


 


Carbon Dioxide   26 


 


Anion Gap   6 


 


BUN   21 


 


Creatinine   0.69 


 


Est GFR ( Amer)   104.5 


 


Est GFR (Non-Af Amer)   81.3 


 


BUN/Creatinine Ratio   30.4 H 


 


Glucose   182 H 


 


Calcium   8.6 














  05/11/18 05/11/18





  05:24 05:24


 


WBC  


 


RBC  


 


Hgb  


 


Hct  


 


MCV  


 


MCH  


 


MCHC  


 


RDW  


 


Plt Count  


 


MPV  


 


Neut % (Auto)  


 


Lymph % (Auto)  


 


Mono % (Auto)  


 


Eos % (Auto)  


 


Baso % (Auto)  


 


Absolute Neuts (auto)  


 


Absolute Lymphs (auto)  


 


Absolute Monos (auto)  


 


Absolute Eos (auto)  


 


Absolute Basos (auto)  


 


Absolute Nucleated RBC  


 


Nucleated RBC %  


 


INR (Anticoag Therapy)  1.01 


 


Sodium   132 L


 


Potassium   3.8


 


Chloride   101


 


Carbon Dioxide   25


 


Anion Gap   6


 


BUN   21


 


Creatinine   0.61


 


Est GFR ( Amer)   120.5


 


Est GFR (Non-Af Amer)   93.7


 


BUN/Creatinine Ratio   34.4 H


 


Glucose   160 H


 


Calcium   8.5 L

## 2018-05-11 NOTE — OP
OPERATIVE REPORT:

 

DATE OF SURGERY:  05/10/18

 

DATE OF BIRTH:  [birth year]

 

SURGEON:  Natasha Morris MD

 

ASSISTANT:  MELIZA Mondragon

 

Ms. Lisa did help throughout the procedure with preparation of the leg, wound retraction, manip
ulation of the knee and wound closure.

 

ANESTHESIOLOGIST:  Dr. Solitario.

 

ANESTHESIA:  Spinal.

 

PRE-OP DIAGNOSIS:  Severe end-stage degenerative osteoarthritis of the left knee joint.

 

POST-OP DIAGNOSIS:  Severe end-stage degenerative osteoarthritis of the left knee joint.

 

OPERATIVE PROCEDURE:  Left total knee arthroplasty.

 

TOURNIQUET TIME:  43 minutes.

 

COMPLICATIONS:  None.

 

ESTIMATED BLOOD LOSS:  300 cc.

 

SPECIMEN:  Bone and cartilage from the left knee joint sent to Pathology.

 

HARDWARE USED:  This is cemented [employer] hardware.  Two packages of Simplex bone cement.  Fo
r the femur, a left size 4 posterior stabilized Legion femoral component.  For the tibia, size 3 left
 Samantha II tibial baseplate.  For the insert, a 9-mm constrained articular insert size 3/4 and for t
he patella, a 29- mm 7.5 thickness 3-peg all poly patella.

 

BRIEF HISTORY/INDICATIONS:  Ms. Sneed is an 83-year-old female with years of increasingly severe lef
t knee pain.  Radiographs showed bone-on-bone valgus arthritis.  She failed conservative treatment wi
th anti-inflammatories, pain medication, intra-articular injection and physical therapy.  Due to cont
inued pain and decreased quality of life, she elected to undergo left total knee arthroplasty. Inform
ed consent was obtained from the patient.  She understood the risks of surgery included, but were not
 limited to bleeding, infection, damage to nearby structures, continued pain, need for further surger
y, intraoperative fracture, nerve palsy, hardware failure or loosening, knee stiffness, loss of motio
n, stroke, heart attack, blood clot, and death.  She wished to proceed.

 

INTRAOPERATIVE FINDINGS:  Intraoperatively, the patient was noted to have extreme osteopenia.  She ha
d full thickness loss of cartilage in all 3 compartments.

 

DESCRIPTION OF PROCEDURE:  Ms. Sneed was identified in the preanesthesia unit. Her left lower extrem
ity was marked as the correct operative side.  Informed consent was signed and placed in the chart.  
The patient was taken to the operating room and placed under spinal anesthesia.  A Carreno catheter was
 placed.  Tourniquet was placed on the left thigh.  Left lower extremity was prepped and draped in th
e usual sterile fashion.  Preop time-out was made to correctly identify the patient, side, and site. 
 Appropriate perioperative antibiotics were given within 1 hour of incision.

 

Tourniquet was inflated and total tourniquet time for this procedure was 44 minutes.  A 12-cm midline
 incision was made with a 10 blade and carried down to the extensor mechanism.  A new 10-blade was us
ed to make a standard medial parapatellar arthrotomy.  The patella was subluxed laterally.  Electroca
utery was used to subperiosteally elevate the soft tissue off the superomedial tibia to the mid sagit
eric plane.  Medial tibial osteophytes were carefully removed.  The knee was flexed up.  Anterior horn
 of the lateral meniscus and ACL were sharply released.  A drill was used to enter the distal femur. 
 Intramedullary distal femoral cutting guide was placed and pinned into the position.  Oscillating sa
w was used to make the distal femoral cut.  Next, the external rotation guide was pinned on the dista
l femur and the distal femur was sized to a size 4.  Size 4 multi-cutting jig was pinned on the dista
l femur.  Oscillating saw was used to make the appropriate chamfer cuts.

 

The PCL was completely released.  The tibia was subluxed anteriorly. Extramedullary tibial cutting gu
thalia was pinned on the proximal tibia.  Oscillating saw was used to make a proximal tibial cut perpend
icular to the mechanical axis of the tibia.  The bone was carefully removed.  The knee was brought ou
t into full extension.  A spacer block had good fit.  Some MCL laxity was noted and this was baseline
.  Preop amount of MCL laxity.  A small amount of soft tissue released laterally, improved the medial
 and lateral ligamentous balancing.  Flexion and extension gaps were well balanced.  The knee was fle
xed up.  Lamina  was placed both medially and laterally.  Any remaining meniscus was carefull
y removed using electrocautery.

 

A sized 4 left distal femoral trial was impacted onto the distal femur and had excellent fit.  The suzanne
x for the posterior stabilized implant was prepared using a reamer and box cut osteotome.  A size 3 t
ibial baseplate with a 9-mm insert trial was placed and the knee was brought out into full extension.
  Knee had good range of motion from full extension to 130 degrees of flexion with satisfactory reza
lofemoral tracking.  The patella was everted.  A 7-mm of patellar bone and cartilage was carefully re
moved using an oscillating saw.  Patella was sized to a size 29.  Three peg holes were drilled throug
h the size 29 guide.  A 29 patellar trial was placed and the knee was taken through a range of motion
.  The patellofemoral tracking was satisfactory.  All trials were removed.  The tibia was subluxed an
teriorly and sized to a size 3.  Proximal tibia was prepared using a size 3 keel punch.  All bony cut
 surfaces were copiously irrigated with sterile saline and dried.  Final implants were cemented into 
place starting with the tibia, followed by the femur and last the patella.

 

A 9-mm insert trial was placed and the knee was brought out into full extension.

 

Tourniquet was turned down at 43 minutes.  The knee was copiously irrigated. Electrocautery was used 
to obtain meticulous hemostasis.  As soon as the cement was fully cured, the insert trial was removed
.  Any excess cement was removed from around the capsule and hardware.  Final insert chosen was a 9-m
m size 3-4 constrained articular insert.  This was locked into position on the tibial tray. Stability
 of the insert was checked and rechecked and noted to be stable.

 

The knee was once again copiously irrigated with sterile saline.  Extensor mechanism was closed using
 interrupted #1 Vicryls over a medium Hemovac drain.  The rest of the incision was closed in a layere
d fashion using 0 and 2-0 Vicryls.  The skin was closed using running 3-0 nylon suture.  Sterile Xero
form, 4x4s, and Webril were used to cover the incision.  Ace wrap and cold pack were placed over this
. The patient's anesthesia was reversed without difficulty.  She was taken to the PACU in stable cond
ition.  Intended weightbearing will be weightbearing as tolerated.  Intended DVT prophylaxis will be 
Coumadin with a Lovenox bridge.

 

 [id number]/CPS #: [id number]

## 2018-05-11 NOTE — PN
Hospitalist Progress Note


Date of Service: 05/11/18





Called by night nurse due to syncope episode.  VSS, patient with no complaints 

of chest pain or sob.  BP noted to be 114/60, ? orthostasis, plan for h and h 

now, bmp, ekg stat now, and tele, will follow,

## 2018-05-12 VITALS — SYSTOLIC BLOOD PRESSURE: 139 MMHG | DIASTOLIC BLOOD PRESSURE: 57 MMHG

## 2018-05-12 LAB
HCT VFR BLD AUTO: 30 % (ref 35–47)
HGB BLD-MCNC: 10 G/DL (ref 12–16)
INR PPP/BLD: 1.67 (ref 0.77–1.02)
PLATELET # BLD AUTO: 203 10^3/UL (ref 150–450)

## 2018-05-12 RX ADMIN — MAGNESIUM OXIDE TAB 400 MG (241.3 MG ELEMENTAL MG) SCH MG: 400 (241.3 MG) TAB at 08:26

## 2018-05-12 RX ADMIN — THERA TABS SCH TAB: TAB at 08:26

## 2018-05-12 RX ADMIN — MAGNESIUM HYDROXIDE SCH ML: 400 SUSPENSION ORAL at 08:25

## 2018-05-12 RX ADMIN — DOCUSATE SODIUM SCH MG: 100 CAPSULE, LIQUID FILLED ORAL at 08:26

## 2018-05-12 RX ADMIN — ENOXAPARIN SODIUM SCH MG: 30 INJECTION SUBCUTANEOUS at 11:59

## 2018-05-12 RX ADMIN — METOPROLOL TARTRATE SCH MG: 50 TABLET, FILM COATED ORAL at 08:26

## 2018-05-12 RX ADMIN — ACETAMINOPHEN PRN MG: 325 TABLET ORAL at 03:27

## 2018-05-12 RX ADMIN — ACETAMINOPHEN PRN MG: 325 TABLET ORAL at 07:38

## 2018-05-12 RX ADMIN — OMEPRAZOLE SCH MG: 20 CAPSULE, DELAYED RELEASE ORAL at 08:26

## 2018-05-12 RX ADMIN — ACETAMINOPHEN PRN MG: 325 TABLET ORAL at 11:58

## 2018-05-12 NOTE — PN
Progress Note





- Progress Note


Date of Service: 05/12/18


SOAP: 


Subjective:


Pt states she is doing well pain controlled.  She denies dizziness or recent 

syncopal episodes. She is doing well with PT. Denies N/T, F/C, CP/SOB and is 

doing well otherwise








Objective:


PE: 84 y/o WDWN F NAD, A&O x 3


LLE- dressing changes, inc c/d/i, no warmth or erythema, +DF/PF ankle, calf 

soft NT, NVI





 Vital Signs











Temp Pulse Resp BP Pulse Ox


 


 98.2 F   88   16   137/45   98 


 


 05/12/18 07:14  05/12/18 07:14  05/12/18 08:00  05/12/18 07:14  05/12/18 08:29








 Laboratory Results - last 24 hr











  05/12/18 05/12/18





  05:36 05:36


 


Hgb  10.0 L 


 


Hct  30 L 


 


Plt Count  203 


 


MPV  8.8 


 


INR (Anticoag Therapy)   1.67 H




















Assessment:


Stable 84 y/o female s/p L TKA by DR Morris 5/10/2018.


Has episode syncope post op








Plan:


- Cont coumadin DVT prophylaxis, Tylenol and tramadol for pain and colace for 

constipation


- Continue PT/ OT 


- Follow up with Dr. Morris within 10-14 days  


- Syncope and BP normalized


-DC to home with VNS today

## 2018-05-12 NOTE — PN
Subjective


Date of Service: 05/12/18


Interval History: 





Mrs. Sneed is doing very well today. She slept well last night, tolerating PT 

and denies any pain. No dizziness, headaches, near-syncope, chest pain or SOB. 

She was seen earlier by ortho team, plan to discharge her back to home. She has 

no complaints today.





Family History: Unchanged from Admission


Social History: Unchanged from Admission


Past Medical History: Unchanged from Admission





Objective


Active Medications: 








Acetaminophen (Tylenol Tab*)  650 mg PO Q4H PRN


   PRN Reason: PAIN OR TEMPERATURE


   Last Admin: 05/12/18 11:58 Dose:  650 mg


Bisacodyl (Dulcolax Supp*)  10 mg [place] DAILY PRN


   PRN Reason: constipation


Cyclobenzaprine HCl (Flexeril Tab*)  5 mg PO TID PRN


   PRN Reason: SPASMS


Diphenhydramine HCl (Benadryl Iv*)  12.5 mg IV Q6H PRN


   PRN Reason: PRURITIS


Diphenhydramine HCl (Benadryl Liq*)  12.5 mg PO Q6H PRN


   PRN Reason: itching


Docusate Sodium (Colace Cap*)  100 mg PO BID [facility]


   Last Admin: 05/12/18 08:26 Dose:  100 mg


Enoxaparin Sodium (Lovenox(*))  30 mg SUBCUT Q24H [facility]


   Last Admin: 05/12/18 11:59 Dose:  30 mg


Lactated Ringer's (Lactated Ringers 1000 Ml Bag*)  1,000 mls @ 75 mls/hr IV PER 

RATE [facility]


   Last Admin: 05/10/18 20:04 Dose:  75 mls/hr


Lactulose (Lactulose*)  30 ml PO Q6H PRN


   PRN Reason: constipation


Magnesium Hydroxide (Milk Of Magnesia Liq*)  30 ml PO BID [facility]


   Last Admin: 05/12/18 08:25 Dose:  30 ml


Magnesium Hydroxide (Milk Of Magnesia Liq*)  30 ml PO Q6H PRN


   PRN Reason: constipation


Magnesium Oxide (Magox 400 Tab*)  400 mg PO DAILY [facility]


   Last Admin: 05/12/18 08:26 Dose:  400 mg


Metoprolol Tartrate (Lopressor Tab*)  50 mg PO BID [facility]


   Last Admin: 05/12/18 08:26 Dose:  50 mg


Morphine Sulfate (Morphine Vial*)  2 mg IV Q2H PRN


   PRN Reason: PAIN


Multivitamins (Theragran Tab*)  1 tab PO DAILY [facility]


   Last Admin: 05/12/18 08:26 Dose:  1 tab


Omeprazole (Prilosec Cap*)  20 mg PO DAILY [facility]


   Last Admin: 05/12/18 08:26 Dose:  20 mg


Ondansetron HCl (Zofran Inj*)  4 mg IV Q6H PRN


   PRN Reason: nausea


Ondansetron HCl (Zofran Tab*)  4 mg PO Q6H PRN


   PRN Reason: NAUSEA


Oxycodone HCl (Roxycodone Tab*)  10 mg PO Q4H PRN


   PRN Reason: pain


   Last Admin: 05/11/18 06:28 Dose:  5 mg


Oxycodone/Acetaminophen (Percocet 5/325 Tab*)  2 tab PO Q4H PRN


   PRN Reason: PAIN


   Last Admin: 05/11/18 04:14 Dose:  2 tab


Oxycodone/Acetaminophen (Percocet 5/325 Tab*)  1 tab PO Q4H PRN


   PRN Reason: PAIN


   Last Admin: 05/10/18 23:24 Dose:  1 tab


Pharmacy Profile Note (Coumadin Daily Reminder*)  1 note FOLLOW UP 1700 [facility]


   Last Admin: 05/11/18 17:07 Dose:  1 note


Polyethylene Glycol/Electrolytes (Miralax*)  17 gm PO DAILY PRN


   PRN Reason: Constipation


Temazepam (Restoril Cap*)  15 mg PO BEDTIME PRN


   PRN Reason: INSOMNIA


   Last Admin: 05/11/18 22:39 Dose:  15 mg








 Vital Signs - 8 hr











  05/12/18 05/12/18 05/12/18





  07:14 08:00 08:29


 


Temperature 98.2 F  


 


Pulse Rate 88  


 


Respiratory 16 16 





Rate   


 


Blood Pressure 137/45  





(mmHg)   


 


O2 Sat by Pulse 98 98 98





Oximetry   














  05/12/18





  11:05


 


Temperature 97.5 F


 


Pulse Rate 78


 


Respiratory 16





Rate 


 


Blood Pressure 139/57





(mmHg) 


 


O2 Sat by Pulse 99





Oximetry 











Oxygen Devices in Use Now: None


Appearance: Sitting on her chair, dressed and ready for discharge, appears 

comfortable and in NAD.


Eyes: No Scleral Icterus, PERRLA


Ears/Nose/Mouth/Throat: Clear Oropharnyx, Mucous Membranes Moist


Neck: NL Appearance and Movements; NL JVP, Trachea Midline


Respiratory: Symmetrical Chest Expansion and Respiratory Effort, Clear to 

Auscultation


Cardiovascular: NL Sounds; No Murmurs; No JVD, RRR


Abdominal: NL Sounds; No Tenderness; No Distention


Extremities: No Edema


Skin: No Rash or Ulcers


Neurological: Alert and Oriented x 3, NL Sensation, NL Muscle Strength and Tone


Nutrition: Taking PO's


Result Diagrams: 


 05/12/18 05:36





 05/11/18 05:24


Additional Lab and Data: 


.


Microbiology and Other Data: 


.


Diagnostic Imaging: 


.


EKG Data: 


.





Assess/Plan/Problems-Billing


Assessment: 





An 84 y/o female with PMH of HTN, mitral valve regurgitation and distolic 

dysfunction, who is POD#2, s/p left total knee arthroplasty, doing better very 

well and being discharged to home this morning.














- Patient Problems


(1) Knee joint replacement status


Current Visit: Yes   Status: Acute   Priority: High   Comment: 


- Management per ortho


- PT/OT eval, patient will recieve visiting nurses and PT at home


- Pain well controlled


   





(2) Hypertension


Current Visit: Yes   Status: Acute   Comment: 


- BP well controlled this AM


- Continue home meds upon discharge including HCTZ





   





(3) Syncope


Current Visit: Yes   Status: Acute   Priority: High   Comment: 


- Likely secondary to orthostatic hypotension


- H/H rechecked today, hemodynamically stable





   





(4) Orthostatic hypotension


Current Visit: Yes   Status: Acute   Comment: 


- Resolved


   





(5) Mitral regurgitation


Current Visit: Yes   Status: Acute   Comment: 


- Stable


   





(6) DVT (deep venous thrombosis)


Current Visit: Yes   Status: Acute   Comment: 


- Warfarin per ortho team, check INR on Monday


   





(7) Full code status


Current Visit: Yes   Status: Acute   


Status and Disposition: 





Inpatient. Anticipate discharge to home today per ortho team. From a medical 

standpoint, patient had done very well. She will resume her meds at home and 

advised to follow up with PCP in 2 weeks.

## 2018-05-12 NOTE — DS
DISCHARGE SUMMARY:

 

DATE OF ADMISSION:  05/10/18

 

DATE OF DISCHARGE:  05/12/18

 

PROVIDER:  Dr. Natasha Morris.* (DICTATED BY MELIZA JACOBSON)

 

ADMITTING DIAGNOSIS:  Status post left total knee arthroplasty for left knee 
osteoarthritis.

 

SECONDARY DIAGNOSES:

1.  Hypertension.

2.  Valvular disease.

3.  History of melanoma.

 

CONSULTATION:  PT, OT, and Medicine.

 

HISTORY OF PRESENT ILLNESS:  Ms. Sneed is an 83-year-old female who presented 
to the clinic with complaints of left knee pain due to severe end-stage 
osteoarthritis.  She failed conservative measures and elected to proceed with a 
left total knee arthroplasty on 05/10/18 with Dr. Morris.

 

HOSPITAL COURSE:  The patient was admitted to [facility] [place]10/18.  She underwent a 
left total knee arthroplasty.  Postoperatively, she recovered on the short-stay 
surgical unit.  Postop day 1, the Carreno was removed.  She was able to urinate 
on her own.  She was advanced to a regular diet without difficulty and her pain 
was controlled with oral pain medications and Tylenol.  She was restarted on 
her home medications.  Labs and vitals remained stable.  She did have one 
episode of syncope after anesthesia.  With the pain meds, that improved.  She 
became normotensive.  She was able to weight bear as tolerated on the left 
lower extremity.  She advanced appropriately with physical therapy and 
occupational therapy.  DVT prophylaxis was managed with Lovenox and Coumadin 
until she reached therapeutic INR.  By postop day 2, she was orthopedically and 
medically stable for discharge to home with VNS services.

 

DISCHARGE CONDITION:  Stable.

 

DISCHARGE MEDICATIONS:  Home medications continued on discharge to include:

1.  Glucosamine and chondroitin 1 tab by mouth daily.

2.  Multivitamin 1 by mouth daily.

3.  Cetirizine 10 mg 1 by mouth every evening.

4.  Diovan 80 mg 1 by mouth in the morning.

5.  Methylcellulose 500 mg 1 by mouth daily.

6.  Vitamin D3 1000 units 1 by mouth daily.

7.  Magnesium oxide 400 mg 1 by mouth daily.

8.  Metoprolol 50 mg 1 by mouth twice a day.

9.  Hydrochlorothiazide 25 mg 1 by mouth in the morning.

10.  Omeprazole 20 mg 1 by mouth daily.

11.  Evista 60 mg 1 by mouth daily.

12.  Nasonex 1 spray both nares daily.

13.  Aspirin 325 mg 1 by mouth daily.  The patient was told to discontinue this 
while on the Coumadin, will restart after one month of Coumadin is done.

14.  Tylenol 325 mg 2 tabs every 4 hours as needed for pain.

 

New medications on discharge to include:

1.  Colace 100 mg 1 by mouth twice a day.

2.  Tramadol 50 mg 1 to 2 tabs every 6 hours as needed for pain.

3.  Coumadin 2 mg 1 to 5 tabs daily as directed by physician.

 

DISCHARGE INSTRUCTIONS:  The patient will be weight bearing as tolerated on the 
left lower extremity.  She will continue physical therapy and occupational 
therapy. It is okay for her to shower postop day 3.  No bathing, swimming, 
submerging wound. Use gentle soap and pat dry.  Cover with gauze, Ace, and 
tape.  Call ortho office with increased drainage, redness, increased pain or 
fever, go to the ER with shortness of breath or chest pain.  Diet:  Regular 
diet.  Increase fluids and fiber to prevent constipation.  Use stool softeners.
  Call with no bowel movement within 48 hours.  Continue PT and OT exercises as 
shown.  Visiting nurses to do wound checks and blood draws for INRs on Monday 
and Thursday.  Take Coumadin for DVT prophylaxis for 1 month postop.  Dosing on 
05/12/18, 8 mg; dosing on 05/13/18, 6 mg.  Her INR on 05/12/18 was 1.67.  
Redraw on 05/14/18.  The patient was instructed not to take aspirin, ibuprofen, 
or naproxen while on the Coumadin.  Continue pain control with tramadol 1 to 2 
every 6 hours, maximum of 8 a day, and Tylenol as needed for pain.  She will 
require antibiotics prior to any dental work in the future.  She will follow up 
with Dr. Morris 10 to 14 days postop.

 

____________________________________ MELIZA JACOBSON

 

[id number]/CPS #: [id number]

[facility]

## 2019-02-12 ENCOUNTER — HOSPITAL ENCOUNTER (EMERGENCY)
Dept: HOSPITAL 25 - ED | Age: 84
Discharge: HOME | End: 2019-02-12
Payer: MEDICARE

## 2019-02-12 VITALS — DIASTOLIC BLOOD PRESSURE: 66 MMHG | SYSTOLIC BLOOD PRESSURE: 143 MMHG

## 2019-02-12 DIAGNOSIS — R42: ICD-10-CM

## 2019-02-12 DIAGNOSIS — Z86.73: ICD-10-CM

## 2019-02-12 DIAGNOSIS — R06.2: ICD-10-CM

## 2019-02-12 DIAGNOSIS — R55: ICD-10-CM

## 2019-02-12 DIAGNOSIS — Z95.4: ICD-10-CM

## 2019-02-12 DIAGNOSIS — R07.9: ICD-10-CM

## 2019-02-12 DIAGNOSIS — I10: ICD-10-CM

## 2019-02-12 DIAGNOSIS — Z88.6: ICD-10-CM

## 2019-02-12 DIAGNOSIS — I48.91: Primary | ICD-10-CM

## 2019-02-12 LAB
ALBUMIN SERPL BCG-MCNC: 3.8 G/DL (ref 3.2–5.2)
ALBUMIN/GLOB SERPL: 1.1 {RATIO} (ref 1–3)
ALP SERPL-CCNC: 55 U/L (ref 34–104)
ALT SERPL W P-5'-P-CCNC: 14 U/L (ref 7–52)
ANION GAP SERPL CALC-SCNC: 8 MMOL/L (ref 2–11)
AST SERPL-CCNC: 21 U/L (ref 13–39)
BASOPHILS # BLD AUTO: 0.1 10^3/UL (ref 0–0.2)
BUN SERPL-MCNC: 23 MG/DL (ref 6–24)
BUN/CREAT SERPL: 33.3 (ref 8–20)
CALCIUM SERPL-MCNC: 9.6 MG/DL (ref 8.6–10.3)
CHLORIDE SERPL-SCNC: 98 MMOL/L (ref 101–111)
EOSINOPHIL # BLD AUTO: 0.1 10^3/UL (ref 0–0.6)
GLOBULIN SER CALC-MCNC: 3.4 G/DL (ref 2–4)
GLUCOSE SERPL-MCNC: 110 MG/DL (ref 70–100)
HCO3 SERPL-SCNC: 25 MMOL/L (ref 22–32)
HCT VFR BLD AUTO: 40 % (ref 35–47)
HGB BLD-MCNC: 13.2 G/DL (ref 12–16)
INR PPP/BLD: 0.89 (ref 0.77–1.02)
LYMPHOCYTES # BLD AUTO: 1.5 10^3/UL (ref 1–4.8)
MCH RBC QN AUTO: 30 PG (ref 27–31)
MCHC RBC AUTO-ENTMCNC: 34 G/DL (ref 31–36)
MCV RBC AUTO: 89 FL (ref 80–97)
MONOCYTES # BLD AUTO: 0.5 10^3/UL (ref 0–0.8)
NEUTROPHILS # BLD AUTO: 6.3 10^3/UL (ref 1.5–7.7)
NRBC # BLD AUTO: 0 10^3/UL
NRBC BLD QL AUTO: 0
PLATELET # BLD AUTO: 242 10^3/UL (ref 150–450)
POTASSIUM SERPL-SCNC: 3.7 MMOL/L (ref 3.5–5)
PROT SERPL-MCNC: 7.2 G/DL (ref 6.4–8.9)
RBC # BLD AUTO: 4.43 10^6/UL (ref 4–5.4)
SODIUM SERPL-SCNC: 131 MMOL/L (ref 135–145)
TROPONIN I SERPL-MCNC: 0.01 NG/ML (ref ?–0.04)
TSH SERPL-ACNC: 4.26 MCIU/ML (ref 0.34–5.6)
WBC # BLD AUTO: 8.5 10^3/UL (ref 3.5–10.8)

## 2019-02-12 PROCEDURE — 80053 COMPREHEN METABOLIC PANEL: CPT

## 2019-02-12 PROCEDURE — 96375 TX/PRO/DX INJ NEW DRUG ADDON: CPT

## 2019-02-12 PROCEDURE — 83605 ASSAY OF LACTIC ACID: CPT

## 2019-02-12 PROCEDURE — 71045 X-RAY EXAM CHEST 1 VIEW: CPT

## 2019-02-12 PROCEDURE — 96374 THER/PROPH/DIAG INJ IV PUSH: CPT

## 2019-02-12 PROCEDURE — 96361 HYDRATE IV INFUSION ADD-ON: CPT

## 2019-02-12 PROCEDURE — 85025 COMPLETE CBC W/AUTO DIFF WBC: CPT

## 2019-02-12 PROCEDURE — 99283 EMERGENCY DEPT VISIT LOW MDM: CPT

## 2019-02-12 PROCEDURE — 83880 ASSAY OF NATRIURETIC PEPTIDE: CPT

## 2019-02-12 PROCEDURE — 85610 PROTHROMBIN TIME: CPT

## 2019-02-12 PROCEDURE — 36415 COLL VENOUS BLD VENIPUNCTURE: CPT

## 2019-02-12 PROCEDURE — 93005 ELECTROCARDIOGRAM TRACING: CPT

## 2019-02-12 PROCEDURE — 84443 ASSAY THYROID STIM HORMONE: CPT

## 2019-02-12 PROCEDURE — 84484 ASSAY OF TROPONIN QUANT: CPT

## 2019-02-12 NOTE — ED
Palpitations / Dysrhythmia





- HPI Summary


HPI Summary: 


Pt is an 84 y/o female who presents to the ED c/o dizziness. She was sent from 

her PCP and was found to be in AFib. Her blood pressure was low, which is 

unusual since she has HTN. Pt began to feel symptoms at 9:30 this morning, but 

notes she slept poorly so possibly had symptoms prior to this time. As per 

family, pt had chest tightness and SOB earlier, which has no resolved. The 

family also notes pt seems to have difficulty coming up with words. Pt states 

she feels odd, and reports dizziness, mild general weakness, near-syncope, 

and. She denies any palpitations, N/V/D, diaphoresis, or urinary sx. PMHx 

mitral regurgitation, which was corrected with a repair in 2014. HR around 130 

while in room. Pt was on Coumadin several years ago for a stroke, but hasnt 

taken the medication in several years. Pt takes daily ASA.








- History of Current Complaint


Chief Complaint: EDDysrhythmPalp


Time Seen by Provider: 02/12/19 12:05


Hx Obtained From: Patient, Family/Caretaker - Familu


Onset/Duration: Gradual Onset, Still Present


Timing: Constant


Aggravating: Nothing


Alleviating: Nothing


Associated Signs & Symptoms: Dizzy, Chest Pain, Shortness of Breath





- Allergy/Home Medications


Allergies/Adverse Reactions: 


 Allergies











Allergy/AdvReac Type Severity Reaction Status Date / Time


 


codeine Allergy  Rash Verified 02/12/19 12:03


 


latex AdvReac  Rash Verified 02/12/19 12:03














PMH/Surg Hx/FS Hx/Imm Hx


Endocrine/Hematology History: 


   Denies: Hx Diabetes


Cardiovascular History: Reports: Hx Hypertension, Hx Valvular Heart Disease - 

mitral valve replacement


   Denies: Hx Pacemaker/ICD


Respiratory History: Reports: Other Respiratory Problems/Disorders - sinusitis


GI History: Reports: Hx Gastroesophageal Reflux Disease


   Denies: Other GI Disorders


 History: 


   Denies: Hx Renal Disease, Other  Problems/Disorders


Musculoskeletal History: Reports: Hx Arthritis - left knee left thumb


   Denies: Hx Osteoporosis, Other Musculoskeletal History


Sensory History: Reports: Hx Cataracts - vickie, Hx Contacts or Glasses - glasses


   Denies: Hx Hearing Aid


Opthamlomology History: Reports: Hx Cataracts - vickie, Hx Contacts or Glasses - 

glasses


Neurological History: Reports: Hx Transient Ischemic Attacks (TIA)


   Denies: Other Neuro Impairments/Disorders


Psychiatric History: 


   Denies: Hx Panic Disorder, Other Psychiatric Issues/Disorders





- Cancer History


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


Hx Chemotherapy: No


Hx Radiation Therapy: No


Hx Palliative Cancer Treatment: No





- Surgical History


Surgery Procedure, Year, and Place: TONSILS AS CHILD.  MITRAL VALVE REPAIR-PT 

HAS CARDS THAT STATE MRI SAFE NO METAL 2014.  TUBAL LIGATION.  BILATERAL 

CATARACT SURGERY


Hx Anesthesia Reactions: No


Infectious Disease History: No


Infectious Disease History: 


   Denies: Traveled Outside the [place] in Last 30 Days





- Family History


Known Family History: Positive: Hypertension





- Social History


Alcohol Use: Weekly


Alcohol Amount: 3 per week


Hx Substance Use: No


Substance Use Type: Reports: None


Hx Tobacco Use: No


Smoking Status (MU): Never Smoked Tobacco





Review of Systems


Negative: Skin Diaphoresis


Positive: Chest Pain - tightness.  Negative: Palpitations


Positive: Shortness Of Breath


Negative: Vomiting, Diarrhea, Nausea


Genitourinary: Negative


Neurological: Other - Dizziness, mild aphasia


Positive: Weakness - generalized, Syncope - Near


All Other Systems Reviewed And Are Negative: Yes





Physical Exam





- Summary


Physical Exam Summary: 


Appearance: Well appearing, mild pain distress


Skin: warm, dry, reflects adequate perfusion


Head/face: normal


Eyes: EOMI, ADY


ENT: mucous membranes moist


Neck: supple, non-tender


Respiratory: CTA, breath sounds present


Cardiovascular: irregularly irregular and rapid rhythm, pulses symmetrical, no 

LE edema


Abdomen: non-tender, soft


Bowel Sounds: present


Musculoskeletal: normal, strength/ROM intact


Neuro: normal, sensory motor intact, A&Ox3





Triage Information Reviewed: Yes


Vital Signs On Initial Exam: 


 Initial Vitals











Temp Pulse Resp BP Pulse Ox


 


 97.7 F   111   20   88/52   98 


 


 02/12/19 12:00  02/12/19 12:00  02/12/19 12:00  02/12/19 12:00  02/12/19 12:00











Vital Signs Reviewed: Yes





Diagnostics





- Vital Signs


 Vital Signs











  Temp Pulse Resp BP Pulse Ox


 


 02/12/19 12:00  97.7 F  111  20  88/52  98














- Laboratory


Result Diagrams: 


 02/12/19 12:43





 02/12/19 12:43


Lab Statement: Any lab studies that have been ordered have been reviewed, and 

results considered in the medical decision making process.





- Radiology


  ** CXR


Radiology Interpretation Completed By: Radiologist


Summary of Radiographic Findings: In the correct clinical setting chest x-ray 

findings could be compatible with mild vascular congestion. ED physician 

reviewed radiology report.





- EKG


  ** 12


Cardiac Rate: Other Rate - Rapid AFib - 141 bpm


EKG Rhythm: Atrial Fibrillation


ST Segment: Normal


Summary of EKG Findings: Nl axis





  ** 12:45


Cardiac Rate: NL - 76 bpm


EKG Rhythm: Sinus Rhythm


ST Segment: Normal


Summary of EKG Findings: Nl axis, nl intervals





Re-Evaluation





- Re-Evaluation


  ** First Eval


Re-Evaluation Time: 12:35


Change: Improved


Comment: Pt has cardioverted on her own.





Course/Dx





- Course


Course Of Treatment: Nurse's notes reviewed.  Patient was rapid atrial 

fibrillation on arrival.  Labs were drawn and the patient spontaneously 

converted to normal sinus rhythm.  She is given IV beta blocker to keep her 

sinus.  Discussed with her cardiologist to plans for outpatient follow-up.  She 

will not need cardioversion etc.  I did give her a dose of Eliquis here given 

the high likelihood of recurrance. D/C in good condition.





- Diagnoses


Differential Diagnosis/HQI/PQRI: Positive: Medication Induced, Paroxymal SVT, 

Other - afib, valvular dz, CAD


Provider Diagnoses: 


 Rapid atrial fibrillation








- Physician Notifications


Discussed Care Of Patient With: Chekih Cruz


Time Discussed With Above Provider: 12:24


Instructed by Provider To: Other - Dr. Cruz agrees with cardioversion.





- Critical Care Time


Critical Care Time: 30-74 min - CCT is EXCLUSIVE of separately billable 

procedures.





Discharge





- Sign-Out/Discharge


Documenting (check all that apply): Patient Departure - Discharge


Patient Received Moderate/Deep Sedation with Procedure: No





- Discharge Plan


Condition: Improved


Disposition: HOME


Prescriptions: 


Apixaban* [Eliquis*] 5 mg PO BID #60 tab


Patient Education Materials:  A-fib (Atrial Fibrillation) (ED)


Referrals: 


Cheikh Cruz MD [Medical Doctor] - 


Parvin Bazzi MD [Primary Care Provider] - 


Additional Instructions: 


Increase your metoprolol to 100 mg twice daily.  Call cardiology today to 

schedule prompt follow-up.  Return with return of palpitations, chest pain, 

difficulty breathing, worse, new symptoms or other concerns as discussed.





- Billing Disposition and Condition


Condition: IMPROVED


Disposition: Home





- Attestation Statements


Document Initiated by Scribe: Yes


Documenting Scribe: Jina Llamas


Provider For Whom Scribe is Documenting (Include Credential): Tru Pretty MD


Scribe Attestation: 


Jina BRITO, scribed for Tru Pretty MD on 02/12/19 at 1423. 


Scribe Documentation Reviewed: Yes


Provider Attestation: 


The documentation as recorded by the denitaibJina alonso accurately reflects the 

service I personally performed and the decisions made by me, Tru Pretty MD


Status of Scribe Document: Viewed

## 2019-09-17 ENCOUNTER — HOSPITAL ENCOUNTER (EMERGENCY)
Dept: HOSPITAL 25 - ED | Age: 84
LOS: 1 days | Discharge: HOME | End: 2019-09-18
Payer: MEDICARE

## 2019-09-17 DIAGNOSIS — S01.411A: Primary | ICD-10-CM

## 2019-09-17 DIAGNOSIS — Z86.73: ICD-10-CM

## 2019-09-17 DIAGNOSIS — K21.9: ICD-10-CM

## 2019-09-17 DIAGNOSIS — I10: ICD-10-CM
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
## 2019-09-17 DIAGNOSIS — S01.21XA: ICD-10-CM

## 2019-09-17 DIAGNOSIS — Z95.4: ICD-10-CM

## 2019-09-17 DIAGNOSIS — S01.511A: ICD-10-CM
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
## 2019-09-17 DIAGNOSIS — Z79.01: ICD-10-CM

## 2019-09-17 PROCEDURE — 99282 EMERGENCY DEPT VISIT SF MDM: CPT

## 2019-09-17 PROCEDURE — 12014 RPR F/E/E/N/L/M 5.1-7.5 CM: CPT

## 2019-09-17 NOTE — ED
Laceration/Wound HPI





- HPI Summary


HPI Summary: 





Patient complains of lacerations to face after walking into a tree.  Patient is 

on Eliquis and was unable to stop bleeding.  Patient denies HA, LOC, vision 

change.  Denies any other pain, injuries or symptoms.  Tetanus status up-to-

date.





- History of Current Complaint


Stated Complaint: FACE INJURY PER PT


Hx Obtained From: Patient


Mechanism of Injury: Sharp/Blunt Trauma


Aggravating: Nothing


Alleviating: Nothing


Onset Severity: Mild


Current Severity: Mild


Pain Intensity: 1


Pain Scale Used: 0-10 Numeric


Associated Signs & Symptoms: Negative


Related Hx: Anticoagulat Use





- Additional Pertinent History


Primary Care Physician: [id number]





- Allergy/Home Medications


Allergies/Adverse Reactions: 


 Allergies











Allergy/AdvReac Type Severity Reaction Status Date / Time


 


codeine Allergy  Rash Verified 02/12/19 12:03


 


latex AdvReac  Rash Verified 02/12/19 12:03











Home Medications: 


 Home Medications





Apixaban* [Eliquis*] 5 mg PO BID 09/17/19 [History Confirmed 09/17/19]


Calcium Citrate TAB* [Citracal TAB*] 600 mg PO DAILY 09/17/19 [History 

Confirmed 09/17/19]


Cetirizine* [ZyrTEC 10 MG TAB*] 10 mg PO QPM 09/17/19 [History Confirmed 09/17/ 19]


Magnesium Oxide TAB* [MagOx 400 TAB*] 400 mg PO DAILY 09/17/19 [History 

Confirmed 09/17/19]


Metoprolol Tartrate TAB* [Lopressor TAB*] 100 mg PO BID 09/17/19 [History 

Confirmed 09/17/19]


Multivitamins/Minerals TAB* [Theragran/minerals TAB*] 1 tab PO DAILY 09/17/19 [

History Confirmed 09/17/19]


Raloxifene (NF) [Evista(NF)] 60 mg PO DAILY 09/17/19 [History Confirmed 09/17/19

]











PMH/Surg Hx/FS Hx/Imm Hx


Endocrine/Hematology History: 


   Denies: Hx Diabetes


Cardiovascular History: Reports: Hx Hypertension, Hx Valvular Heart Disease - 

mitral valve replacement


   Denies: Hx Pacemaker/ICD


Respiratory History: Reports: Other Respiratory Problems/Disorders - sinusitis


GI History: Reports: Hx Gastroesophageal Reflux Disease


   Denies: Other GI Disorders


 History: 


   Denies: Hx Renal Disease, Other  Problems/Disorders


Musculoskeletal History: Reports: Hx Arthritis - left knee left thumb


   Denies: Hx Osteoporosis, Other Musculoskeletal History


Sensory History: Reports: Hx Cataracts - vickie, Hx Contacts or Glasses - glasses


   Denies: Hx Hearing Aid


Opthamlomology History: Reports: Hx Cataracts - vickie, Hx Contacts or Glasses - 

glasses


EENT History: 


   Denies: Hx Deafness


Neurological History: Reports: Hx Transient Ischemic Attacks (TIA)


   Denies: Other Neuro Impairments/Disorders


Psychiatric History: 


   Denies: Hx Panic Disorder, Other Psychiatric Issues/Disorders





- Cancer History


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


Hx Chemotherapy: No


Hx Radiation Therapy: No


Hx Palliative Cancer Treatment: No





- Surgical History


Surgery Procedure, Year, and Place: TONSILS AS CHILD.  MITRAL VALVE REPAIR-PT 

HAS CARDS THAT STATE MRI SAFE NO METAL 2014.  TUBAL LIGATION.  BILATERAL 

CATARACT SURGERY


Hx Anesthesia Reactions: No


Infectious Disease History: No


Infectious Disease History: 


   Denies: Traveled Outside the [place] in Last 30 Days





- Family History


Known Family History: Positive: Hypertension





- Social History


Alcohol Use: Weekly


Alcohol Amount: 3 per week


Hx Substance Use: No


Substance Use Type: Reports: None


Hx Tobacco Use: No


Smoking Status (MU): Never Smoked Tobacco





Review of Systems


Constitutional: Negative


Eyes: Negative


ENT: Negative


Cardiovascular: Negative


Respiratory: Negative


Gastrointestinal: Negative


Genitourinary: Negative


Musculoskeletal: Negative


Skin: Other


Neurological: Negative


Psychological: Normal


All Other Systems Reviewed And Are Negative: Yes





Physical Exam


Triage Information Reviewed: Yes


Vital Signs On Initial Exam: 


 Initial Vitals











Temp Pulse Resp BP Pulse Ox


 


 97.7 F   78   18   197/99   95 


 


 09/17/19 18:45  09/17/19 18:45  09/17/19 18:45  09/17/19 18:45  09/17/19 18:45











Vital Signs Reviewed: Yes


Appearance: Positive: Well-Appearing


Skin: Positive: Warm


Head/Face: Positive: Normal Head/Face Inspection


Eyes: Positive: Normal


Neck: Positive: Supple


Respiratory/Lung Sounds: Positive: Clear to Auscultation


Cardiovascular: Positive: Normal


Abdomen Description: Positive: Nontender


Musculoskeletal: Positive: Normal


Neurological: Positive: Normal


Psychiatric: Positive: Normal


AVPU Assessment: Alert





- [place] Coma Scale


Best Eye Response: 4 - Spontaneous


Best Motor Response: 6 - Obeys Commands


Best Verbal Response: 5 - Oriented


Coma Scale Total: 15





Procedures





- Sedation


Patient Received Moderate/Deep Sedation with Procedure: No





- Laceration/Wound Repair


  ** 1


Location: mouth - Lateral anterior corner of lower lip.  No involvement of 

vermilion border.


Description: Linear


Anesthesia: Local, 1.0%


Length, Depth and Shape: 2cmx .5cm


Betadine Prep?: No


Irrigated w/ Saline (ccs): 100


Laceration/Wound Explored: clean


Debridement: minimal


Number of Sutures: 1 - 6. 0 Vicryl


Layer Closure?: No


Sterile Dressing Applied?: No





  ** 2


Location: face - Laceration over point of nose


Description: Linear


Anesthesia: Local, 1.0%


Length, Depth and Shape: 2cm x .5cm


Betadine Prep?: No - Chlorhexidine


Irrigated w/ Saline (ccs): 100


Laceration/Wound Explored: clean


Debridement: minimal


Number of Sutures: 3 - 6. 0 Ethilon


Layer Closure?: No


Sterile Dressing Applied?: No





  ** 3


Location: face - Base of right nostril


Description: Linear


Anesthesia: Local, 1.0%


Length, Depth and Shape: 1.5cmx .5cm


Betadine Prep?: No


Irrigated w/ Saline (ccs): 200


Laceration/Wound Explored: clean


Debridement: minimal


Number of Sutures: 2 - 6. 0 Ethilon


Layer Closure?: No


Sterile Dressing Applied?: No





  ** 4


Location: face - Right cheek


Description: Linear


Anesthesia: Local, 1.0%


Length, Depth and Shape: 1cm x .5cm


Betadine Prep?: No


Irrigated w/ Saline (ccs): 100


Laceration/Wound Explored: clean


Debridement: minimal


Number of Sutures: 1 - 6.0 ethilon


Layer Closure?: No


Sterile Dressing Applied?: No





Diagnostics





- Vital Signs


 Vital Signs











  Temp Pulse Resp BP Pulse Ox


 


 09/17/19 20:40  97.4 F  74  18  202/84  97


 


 09/17/19 18:45  97.7 F  78  18  197/99  95














- Laboratory


Lab Statement: Any lab studies that have been ordered have been reviewed, and 

results considered in the medical decision making process.





Laceration Repair Course/Dx





- Course


Course Of Treatment: Patient complains of lacerations to face after walking 

into a tree.  Patient is on Eliquis and was unable to stop bleeding.  Patient 

denies HA, LOC, vision change.  Denies any other pain, injuries or symptoms.  

Tetanus status up-to-date.  Vital signs within normal limits.  Bleeding 

controlled.  Wounds cleaned and sutured.





- Clinical Impression


Provider Diagnoses: 


 Face lacerations








Discharge ED





- Sign-Out/Discharge


Documenting (check all that apply): Patient Departure


Patient Received Moderate/Deep Sedation with Procedure: No





- Discharge Plan


Condition: Stable


Disposition: HOME


Patient Education Materials:  Care For Your Stitches (ED), Facial Laceration (ED

)


Referrals: 


Parvin Bazzi MD [Primary Care Provider] - 


Additional Instructions: 


Sutures out in 5 days.  You may wash face with warm running water and soap.  Do 

not submerge face underwater for 4 days.  Follow-up with primary care.





- Billing Disposition and Condition


Condition: STABLE


Disposition: Home





- Attestation Statements


Provider Attestation: 





pt seen by midlevel provider independently, based on their assessment, it was 

not necessary to present the case to me but I was available for consultation. I 

did not form a physician-patient relationship with the patient. The chart 

however, has been reviewed. am signing this note strictly in an administrative 

capacity.

## 2019-09-18 VITALS — SYSTOLIC BLOOD PRESSURE: 189 MMHG | DIASTOLIC BLOOD PRESSURE: 76 MMHG

## 2020-04-22 ENCOUNTER — HOSPITAL ENCOUNTER (EMERGENCY)
Dept: HOSPITAL 25 - ED | Age: 85
LOS: 1 days | Discharge: HOME | End: 2020-04-23
Payer: MEDICARE

## 2020-04-22 DIAGNOSIS — R51: ICD-10-CM

## 2020-04-22 DIAGNOSIS — Z88.6: ICD-10-CM

## 2020-04-22 DIAGNOSIS — Z79.899: ICD-10-CM

## 2020-04-22 DIAGNOSIS — I10: Primary | ICD-10-CM

## 2020-04-22 DIAGNOSIS — Z91.040: ICD-10-CM

## 2020-04-22 DIAGNOSIS — E83.42: ICD-10-CM

## 2020-04-22 DIAGNOSIS — K21.9: ICD-10-CM

## 2020-04-22 DIAGNOSIS — Z95.4: ICD-10-CM

## 2020-04-22 DIAGNOSIS — Z86.73: ICD-10-CM

## 2020-04-22 DIAGNOSIS — H53.8: ICD-10-CM

## 2020-04-22 PROCEDURE — 93005 ELECTROCARDIOGRAM TRACING: CPT

## 2020-04-22 PROCEDURE — 85025 COMPLETE CBC W/AUTO DIFF WBC: CPT

## 2020-04-22 PROCEDURE — 85610 PROTHROMBIN TIME: CPT

## 2020-04-22 PROCEDURE — 84484 ASSAY OF TROPONIN QUANT: CPT

## 2020-04-22 PROCEDURE — 80053 COMPREHEN METABOLIC PANEL: CPT

## 2020-04-22 PROCEDURE — 83735 ASSAY OF MAGNESIUM: CPT

## 2020-04-22 PROCEDURE — 99282 EMERGENCY DEPT VISIT SF MDM: CPT

## 2020-04-22 PROCEDURE — 36415 COLL VENOUS BLD VENIPUNCTURE: CPT

## 2020-04-22 NOTE — XMS REPORT
Continuity of Care Document (CCD)

 Created on:2020



Patient:Margie Sneed

Sex:Female

:1935

External Reference #:[id number]





Demographics







 Address  [address] [place] [address]

 

 Home Phone  [phone]

 

 Mobile Phone  [phone]

 

 Email Address  [email or web address]

 

 Preferred Language  en

 

 Marital Status  Not  or 

 

 Temple Affiliation  Unknown

 

 Race  White

 

 Ethnic Group  Not  or 









Author







 Name  Oscar Fernandez MD (transmitted by agent of provider Liana Franklin)

 

 Address  [address] [place] [address]









Care Team Providers







 Name  Role  Phone

 

 Caro Nuñez MD - Vascular  Care Team Information   [phone]
[phone]



 Surgery    

 

 Ines Tolliver - Podiatrist  Care Team Information   [phone]

 

 Debby Pena - Physical Therapist  Care Team Information   [phone]
[phone]

 

 [facility] - Physical  Care Team Information   [phone]
[phone]



 Therapist    









Problems







 Active Problems  Provider  Date

 

 Varicose veins of lower extremity with  Norma Brooke M.D.  Onset: 



 inflammation    

 

 Gastroesophageal reflux disease  Norma Brooke M.D.  Onset: 2011

 

 Essential hypertension  Norma Brooke M.D.  Onset: 2011

 

 Mitral valve disorder  Norma Brooke M.D.  Onset: 2011

 

 Pure hypercholesterolemia  Norma Brooke M.D.  Onset: 2011







Social History







 Type  Date  Description  Comments

 

 Birth Sex    Unknown  

 

 Tobacco Use  Start: Unknown  Never Smoked Cigarettes  

 

 ETOH Use    Currently consumes 1 glasses of wine  



     weekly  

 

 Tobacco Use  Start: Unknown  Patient has never smoked  

 

 Smoking Status  Reviewed: 19  Patient has never smoked  







Allergies, Adverse Reactions, Alerts







 Active Allergies  Reaction  Severity  Comments  Date

 

 Codeine        10/22/2014









 Inactive Allergies









 NKDA        2006







Medications







 Active Medications  SIG  Qnty  Indications  Ordering  Date



         Provider  

 

 Magnesium  jorge STANFORD  2020



     400mg Tablets  glycinate /2-1      Blegen, M.D.  



   tablet per day        

 

 Amlodipine Besylate  1 by mouth every  30tabs    Oscar  2019



               5mg Tablets  day      MD Rebecca  



           

 

 Lorazepam  take one tablet  14tabs  F43.22  Oscar  2019



     0.5mg Tablets  up to twice a      MD Rebecca  



   day for anxiety        

 

 Mometasone Furoate  2 Sprays Into  17units    Parvin  2019



              50mcg/Act  Each Nostril      Eric,  



 Suspension  Every Morning as      M.D., R.D.  



   Needed For Nasal        



   Congestion        

 

 Raloxifene HCL  Take One Tablet  90tabs  M81.0  Parvin  10/31/2018



          60mg Tablets  By Mouth Every      Eric,  



   Day      M.D., R.COLETTE.  

 

 Irbesartan  Take 1 Tablet By  90tabs    Parvin  2018



      300mg Tablets  Mouth Once Daily      MIRANDA Reyes, R.D.  

 

 Metoprolol Tartrate  Take One Tablet  60tabs    Parvin  2018



               50mg  By Mouth Twice A      Eric,  



 Tablets  Day      M.D., R.D.  

 

 Metoprolol Tartrate  Take One Tablet  60tabs    Parvin  2018



               50mg  By Mouth Twice A      Eric,  



 Tablets  Day      M.D., R.D.  

 

 Citrucel        [facility]  2017



     Powder        MIRANDA Reyes, R.D.  

 

 Senior Multivitamin Plus        [facility]  10/20/2014



         Eric,  



 Tablets        M.D., R.D.  

 

 Glucosamine Chondroitin        [facility]  10/20/2014



 1500 Complex        Eric,  



        1500Com Capsules        M.D., R.D.  



           

 

 Hydrochlorothiazide  Take One Tablet  90tabs  I10  [facility]  2014



               25mg  By Mouth Every      Eric,  



 Tablets  Day      M.D., R.D.  

 

 Omeprazole  Take 1 Capsule  60caps    [facility]  2011



      20mg Capsules DR  By Mouth 1 Or 2      Eric,  



   Times Daily For      M.DBrandi, R.D.  



   Reflux Or        



   Eructation        

 

 Zyrtec  one [facility] prn  30tabs  473.9  Norma MALAVE  10/13/2006



  10mg Tablets  allergies      MIRANDA Brooke  



           

 

 Eliquamena        Brand,  



   5mg Tablets        MD Cheikh  



           







Immunizations







 CPT Code  Status  Date  Vaccine  Lot #

 

 [id number]  Given  2019  Influenza Vaccine High Dose PF  [id number]

 

 10962  Given  2019  Tdap (Adacel)  [id number]

 

 98496  Given  2018  Influenza Vaccine High Dose PF  [id number]

 

 81650  Given  2017  Influenza Vaccine High Dose PF  [id number]

 

 37591  Given  10/07/2016  Influenza Vaccine High Dose PF  [id number]

 

 24702  Given  10/22/2015  Influenza Vaccine High Dose PF  [id number]

 

 50465  Given  2015  Prevnar-13 Pneumococcal Conjugate Vaccine  [id number]

 

 65308  Given  2014  Influenza Vaccine High Dose PF  [id number]

 

 74080  Given  2013  Influenza Vaccine High Dose PF  [id number]

 

 43495  Given  2012  Influenza Vaccine-Preservative Free 3 Yrs And  
[id number]



       Above  

 

 85643  Given  2011  Influenza Vaccine-Preservative Free 3 Yrs And  
[id number]



       Above  

 

 63637  Given  2010  Influenza Vaccine-Preservative Free 3 Yrs And  
[id number]



       Above  

 

 57859  Given  2010  Influenza Vaccine-Preservative Free 3 Yrs And  
[id number]



       Above  

 

 05818  Given  2009  Zoster Vaccine  [id number]

 

 90658  Given  2008  Influenza Virus Vaccine, 3 Yrs And Above  [id number]

 

 19829  Given  2008  Td Age 7 to adult [facility] [place]  [id number]



       Biologics  

 

 12341  Given  06/10/2008  Td Age 7 to adult [facility] [place], [place]  



       Biologics  

 

 64107  Given  10/23/2007  Influenza Virus Vaccine, 3 Yrs And Above  [id number]

 

 16941  Given  10/13/2006  Influenza Virus Vaccine, 3 Yrs And Above  [id number]

 

 24906  Given  2005  Pneumovax 23 (PPSV23) 65+ years or high risk 2 to  



       64 year old  

 

 16598  Given  2004  Influenza Virus Vaccine, 3 Yrs And Above  

 

 33946  Given  10/21/2003  Influenza Virus Vaccine, 3 Yrs And Above  

 

 32042  Given  10/21/2003  Influenza Virus Vaccine, 3 Yrs And Above  

 

 91345  Given  2003  Influenza Virus Vaccine, 3 Yrs And Above  

 

 08454  Given  11/15/2000  Influenza Virus Vaccine, 3 Yrs And Above  

 

 60422  Given  1999  Influenza Virus Vaccine, 3 Yrs And Above  

 

 41590  Given  1998  Influenza Virus Vaccine  

 

 32770  Given  1997  DT (Adult)  

 

 18985  Given  10/15/1996  Influenza Virus Vaccine  







Vital Signs







 Date  Vital  Result  Comment

 

 2019  9:24am  Weight  158.00 lb  









 Weight  71.669 kg  

 

 BP Systolic  124 mmHg  

 

 BP Diastolic  86 mmHg  

 

 Heart Rate  63 /min  

 

 Body Temperature  97.6 F  

 

 O2 % BldC Oximetry  97 %  









 2019 10:45am  Weight  158.00 lb  









 Weight  71.669 kg  

 

 BP Systolic  100 mmHg  

 

 BP Diastolic  68 mmHg  

 

 Heart Rate  60 /min  

 

 Body Temperature  96.8 F  

 

 Respiratory Rate  16 /min  







Results







 Test  Acquired Date  Facility  Test  Result  H/L  Range  Note

 

 Laboratory test  01/15/2020  [facility]  Magnesium  1.6 
mg/dL  Low  1.9-2.7  1



 finding    [phone]          

 

 Laboratory test  2019  [facility]  Magnesium  1.7 
mg/dL  Low  1.9-2.7  2



 finding    [phone]          

 

 Basic Metabolic  2019  [facility]  Sodium  133 mmol
/L  Low  135-145  



 Panel    [phone]          









 Potassium  3.9 mmol/L  Normal  3.5-5.0  

 

 Chloride  100 mmol/L  Low  101-111  

 

 Co2 Carbon Dioxide  31 mmol/L  Normal  22-32  

 

 Anion Gap  2 mmol/L  Normal  2-11  

 

 Glucose  85 mg/dL  Normal    

 

 Blood Urea Nitrogen  20 mg/dL  Normal  6-24  

 

 Creatinine  0.81 mg/dL  Normal  0.51-0.95  

 

 BUN/Creatinine Ratio  24.7  High  8-20  

 

 Calcium  9.3 mg/dL  Normal  8.6-10.3  

 

 Egfr Non-  67.4    >60  

 

 Egfr   81.5    >60  3









 1  [id number]

 

 2  [id number]

 

 3  *******Because ethnic data is not always readily available,



   this report includes an eGFR for both -Americans and



   non- Americans.****



   The National Kidney Disease Education Program (NKDEP) does



   not endorse the use of the MDRD equation for patients that



   are not between the ages of 18 and 70, are pregnant, have



   extremes of body size, muscle mass, or nutritional status,



   or are non- or non-.



   According to the National Kidney Foundation, irrespective of



   diagnosis, the stage of the disease is based on the level of



   kidney function:



   Stage Description                      GFR(mL/min/1.73 m(2))



   1     Kidney damage with normal or decreased GFR       90



   2     Kidney damage with mild decrease in GFR          60-89



   3     Moderate decrease in GFR                         30-59



   4     Severe decrease in GFR                           15-29



   5     Kidney failure                       <15 (or dialysis)







Procedures







 Description

 

 No Information Available







Medical Devices







 Description

 

 No Information Available







Encounters







 Type  Date  Location  Provider  Dx  Diagnosis

 

 Office Visit  2020  Main Office  FRANK Moscoso  Essential (
primary)



   3:00p    MD    hypertension







Assessments







 Date  Code  Description  Provider

 

 2020  I10  Essential (primary) hypertension  Oscar Fernandez MD

 

 01/15/2020  E83.42  Hypomagnesemia  Parvin Reyes M.D., R.D.

 

 01/15/2020  E83.42  Hypomagnesemia  [facility]

 

 2019  E83.42  Hypomagnesemia  Parvin Reyes M.D., R.D.

 

 2019  E83.42  Hypomagnesemia  [facility]

 

 2019  I10  Essential (primary) hypertension  Parvin Reyes M.D., R.D.

 

 2019  I10  Essential (primary) hypertension  [facility]







Plan of Treatment

Future Appointment(s):2020  8:00 am - [facility] 
[id number]  1:45 pm - Parvin Reyes M.D., R.D. at Main [id number] 
- Oscar Fernandez MDI10 Essential (primary) hypertensionComments:She is 
having a relatively severe exacerbation of her blood pressure.  Last time this 
worked almost too well.  She had to stop it.This shows a pattern of poorly 
controlled blood pressure off amlodipine, and over controlled blood pressure 
when she remains on amlodipine.It seems like the medicine is working extremely 
well for her.  In the case that she ends up overtreated again, I would try 
lowering 1 of her other blood pressure medicines or eliminating it.  5 mg of 
amlodipine seems to be affecting her blood pressure more than might be 
expected.She will check her blood pressure daily for the next couple of weeks.



Functional Status







 Description

 

 No Information Available







Mental Status







 Description

 

 No Information Available







Referrals







 Description

 

 No Information Available

## 2020-04-22 NOTE — XMS REPORT
Continuity of Care Document (CCD)

 Created on:2020



Patient:Margie Sneed

Sex:Female

:1935

External Reference #:[id number]





Demographics







 Address  [address] [place] [address]

 

 Home Phone  [phone]

 

 Mobile Phone  [phone]

 

 Email Address  [email or web address]

 

 Preferred Language  en

 

 Marital Status  Not  or 

 

 [employer] Affiliation  Unknown

 

 Race  White

 

 Ethnic Group  Not  or 









Author







 Name  Oscar Fernandez MD (transmitted by agent of provider [facility])

 

 Address  [address] [place] [address]









Care Team Providers







 Name  Role  Phone

 

 Caro Nuñez MD - Vascular  Care Team Information   [phone]
[phone]



 Surgery    

 

 Ines Tolliver - Podiatrist  Care Team Information   [phone]

 

 Debby Pena - Physical Therapist  Care Team Information   [phone]
[phone]

 

 [facility] - Physical  Care Team Information   [phone]
[phone]



 Therapist    









Problems







 Active Problems  Provider  Date

 

 Varicose veins of lower extremity with  Norma Brooke M.D.  Onset: 



 inflammation    

 

 Gastroesophageal reflux disease  Norma Brooke M.D.  Onset: 2011

 

 Essential hypertension  Norma Brooke M.D.  Onset: 2011

 

 Mitral valve disorder  Norma Brooke M.D.  Onset: 2011

 

 Pure hypercholesterolemia  Norma Brooke M.D.  Onset: 2011







Social History







 Type  Date  Description  Comments

 

 Birth Sex    Unknown  

 

 Tobacco Use  Start: Unknown  Never Smoked Cigarettes  

 

 ETOH Use    Currently consumes 1 glasses of wine  



     weekly  

 

 Tobacco Use  Start: Unknown  Patient has never smoked  

 

 Smoking Status  Reviewed: 19  Patient has never smoked  







Allergies, Adverse Reactions, Alerts







 Active Allergies  Reaction  Severity  Comments  Date

 

 Codeine        10/22/2014









 Inactive Allergies









 NKDA        2006







Medications







 Active Medications  SIG  Qnty  Indications  Ordering  Date



         Provider  

 

 Magnesium  jorge STANFORD  2020



     400mg Tablets  glycinate /2-1      Blegen, M.D.  



   tablet per day        

 

 Amlodipine Besylate  1 by mouth every  30tabs    Oscar  2019



               5mg Tablets  day      MD Rebecca  



           

 

 Lorazepam  take one tablet  14tabs  F43.22  Oscar  2019



     0.5mg Tablets  up to twice a      MD Rebecca  



   day for anxiety        

 

 Mometasone Furoate  2 Sprays Into  17units    Parvin  2019



              50mcg/Act  Each Nostril      Eric,  



 Suspension  Every Morning as      M.D., R.D.  



   Needed For Nasal        



   Congestion        

 

 Raloxifene HCL  Take One Tablet  90tabs  M81.0  Parvin  10/31/2018



          60mg Tablets  By Mouth Every      Eric,  



   Day      M.D., R.COLETTE.  

 

 Irbesartan  Take 1 Tablet By  90tabs    Parvin  2018



      300mg Tablets  Mouth Once Daily      MIRANDA Reyes, R.D.  

 

 Metoprolol Tartrate  Take One Tablet  60tabs    Parvin  2018



               50mg  By Mouth Twice A      Eric,  



 Tablets  Day      M.D., R.D.  

 

 Metoprolol Tartrate  Take One Tablet  60tabs    Parvin  2018



               50mg  By Mouth Twice A      Eric,  



 Tablets  Day      M.D., R.D.  

 

 Citrucel        [facility]  2017



     Powder        MIRANDA Reyes, R.D.  

 

 Senior Multivitamin Plus        [facility]  10/20/2014



         Eric,  



 Tablets        M.D., R.D.  

 

 Glucosamine Chondroitin        [facility]  10/20/2014



 1500 Complex        Eric,  



        1500Com Capsules        M.D., R.D.  



           

 

 Hydrochlorothiazide  Take One Tablet  90tabs  I10  [facility]  2014



               25mg  By Mouth Every      Eric,  



 Tablets  Day      M.D., R.D.  

 

 Omeprazole  Take 1 Capsule  60caps    [facility]  2011



      20mg Capsules DR  By Mouth 1 Or 2      Eric,  



   Times Daily For      M.DBrandi, R.D.  



   Reflux Or        



   Eructation        

 

 Zyrtec  one [facility] prn  30tabs  473.9  Norma MALAVE  10/13/2006



  10mg Tablets  allergies      MIRANDA Brooke  



           

 

 Eliquamena        Brand,  



   5mg Tablets        MD Cheikh  



           







Immunizations







 CPT Code  Status  Date  Vaccine  Lot #

 

 [id number]  Given  2019  Influenza Vaccine High Dose PF  [id number]

 

 72633  Given  2019  Tdap (Adacel)  [id number]

 

 12253  Given  2018  Influenza Vaccine High Dose PF  [id number]

 

 59911  Given  2017  Influenza Vaccine High Dose PF  [id number]

 

 45717  Given  10/07/2016  Influenza Vaccine High Dose PF  [id number]

 

 60865  Given  10/22/2015  Influenza Vaccine High Dose PF  [id number]

 

 24000  Given  2015  Prevnar-13 Pneumococcal Conjugate Vaccine  [id number]

 

 88881  Given  2014  Influenza Vaccine High Dose PF  [id number]

 

 21602  Given  2013  Influenza Vaccine High Dose PF  [id number]

 

 19326  Given  2012  Influenza Vaccine-Preservative Free 3 Yrs And  
[id number]



       Above  

 

 85431  Given  2011  Influenza Vaccine-Preservative Free 3 Yrs And  
[id number]



       Above  

 

 81544  Given  2010  Influenza Vaccine-Preservative Free 3 Yrs And  
[id number]



       Above  

 

 55903  Given  2010  Influenza Vaccine-Preservative Free 3 Yrs And  
[id number]



       Above  

 

 20172  Given  2009  Zoster Vaccine  1555x

 

 90658  Given  2008  Influenza Virus Vaccine, 3 Yrs And Above  [id number]

 

 18827  Given  2008  Td Age 7 to adult [place] [place]  [id number]



       Biologics  

 

 40246  Given  06/10/2008  [facility] Age [facility] [place]  



       Biologics  

 

 46009  Given  10/23/2007  Influenza Virus Vaccine, 3 Yrs And Above  [id number]

 

 39436  Given  10/13/2006  Influenza Virus Vaccine, 3 Yrs And Above  [id number]

 

 64421  Given  2005  Pneumovax 23 (PPSV23) 65+ years or high risk 2 to  



       64 year old  

 

 07792  Given  2004  Influenza Virus Vaccine, 3 Yrs And Above  

 

 94525  Given  10/21/2003  Influenza Virus Vaccine, 3 Yrs And Above  

 

 22480  Given  10/21/2003  Influenza Virus Vaccine, 3 Yrs And Above  

 

 37350  Given  2003  Influenza Virus Vaccine, 3 Yrs And Above  

 

 73108  Given  11/15/2000  Influenza Virus Vaccine, 3 Yrs And Above  

 

 19674  Given  1999  Influenza Virus Vaccine, 3 Yrs And Above  

 

 15442  Given  1998  Influenza Virus Vaccine  

 

 52272  Given  1997  DT (Adult)  

 

 04828  Given  10/15/1996  Influenza Virus Vaccine  







Vital Signs







 Date  Vital  Result  Comment

 

 2019  9:24am  Weight  158.00 lb  









 Weight  71.669 kg  

 

 BP Systolic  124 mmHg  

 

 BP Diastolic  86 mmHg  

 

 Heart Rate  63 /min  

 

 Body Temperature  97.6 F  

 

 O2 % BldC Oximetry  97 %  









 2019 10:45am  Weight  158.00 lb  









 Weight  71.669 kg  

 

 BP Systolic  100 mmHg  

 

 BP Diastolic  68 mmHg  

 

 Heart Rate  60 /min  

 

 Body Temperature  96.8 F  

 

 Respiratory Rate  16 /min  







Results







 Test  Acquired Date  Facility  Test  Result  H/L  Range  Note

 

 Laboratory test  01/15/2020  [facility]  Magnesium  1.6 
mg/dL  Low  1.9-2.7  1



 finding    [phone]          

 

 Laboratory test  2019  [facility]  Magnesium  1.7 
mg/dL  Low  1.9-2.7  2



 finding    [phone]          

 

 Basic Metabolic  2019  [facility]  Sodium  133 mmol
/L  Low  135-145  



 Panel    [phone]          









 Potassium  3.9 mmol/L  Normal  3.5-5.0  

 

 Chloride  100 mmol/L  Low  101-111  

 

 Co2 Carbon Dioxide  31 mmol/L  Normal  22-32  

 

 Anion Gap  2 mmol/L  Normal  2-11  

 

 Glucose  85 mg/dL  Normal    

 

 Blood Urea Nitrogen  20 mg/dL  Normal  6-24  

 

 Creatinine  0.81 mg/dL  Normal  0.51-0.95  

 

 BUN/Creatinine Ratio  24.7  High  8-20  

 

 Calcium  9.3 mg/dL  Normal  8.6-10.3  

 

 Egfr Non-  67.4    >60  

 

 Egfr   81.5    >60  3









 1  [id number]

 

 2  [id number]

 

 3  *******Because ethnic data is not always readily available,



   this report includes an eGFR for both -Americans and



   non- Americans.****



   The National Kidney Disease Education Program (NKDEP) does



   not endorse the use of the MDRD equation for patients that



   are not between the ages of 18 and 70, are pregnant, have



   extremes of body size, muscle mass, or nutritional status,



   or are non- or non-.



   According to the National Kidney Foundation, irrespective of



   diagnosis, the stage of the disease is based on the level of



   kidney function:



   Stage Description                      GFR(mL/min/1.73 m(2))



   1     Kidney damage with normal or decreased GFR       90



   2     Kidney damage with mild decrease in GFR          60-89



   3     Moderate decrease in GFR                         30-59



   4     Severe decrease in GFR                           15-29



   5     Kidney failure                       <15 (or dialysis)







Procedures







 Description

 

 No Information Available







Medical Devices







 Description

 

 No Information Available







Encounters







 Description

 

 No Information Available







Assessments







 Date  Code  Description  Provider

 

 01/15/2020  E83.42  Hypomagnesemia  Parvin Reyes M.D., R.D.

 

 01/15/2020  E83.42  Hypomagnesemia  [facility]

 

 2019  E83.42  Hypomagnesemia  Parvin Reyes M.D., R.D.

 

 2019  E83.42  Hypomagnesemia  [facility]

 

 2019  I10  Essential (primary) hypertension  Parvin Reyes M.D., R.D.

 

 2019  I10  Essential (primary) hypertension  [facility]







Plan of Treatment

Future Appointment(s):2020  8:00 am - [facility] 
[id number]  1:45 pm - Parvin Reyes M.D., R.D. at Main Office



Functional Status







 Description

 

 No Information Available







Mental Status







 Description

 

 No Information Available







Referrals







 Description

 

 No Information Available

## 2020-04-23 VITALS — DIASTOLIC BLOOD PRESSURE: 79 MMHG | SYSTOLIC BLOOD PRESSURE: 182 MMHG

## 2020-04-23 LAB
ALBUMIN SERPL BCG-MCNC: 3.9 G/DL (ref 3.2–5.2)
ALBUMIN/GLOB SERPL: 1.1 {RATIO} (ref 1–3)
ALP SERPL-CCNC: 56 U/L (ref 34–104)
ALT SERPL W P-5'-P-CCNC: 14 U/L (ref 7–52)
ANION GAP SERPL CALC-SCNC: 6 MMOL/L (ref 2–11)
AST SERPL-CCNC: 22 U/L (ref 13–39)
BASOPHILS # BLD AUTO: 0.1 10^3/UL (ref 0–0.2)
BUN SERPL-MCNC: 25 MG/DL (ref 6–24)
BUN/CREAT SERPL: 29.1 (ref 8–20)
CALCIUM SERPL-MCNC: 9.1 MG/DL (ref 8.6–10.3)
CHLORIDE SERPL-SCNC: 100 MMOL/L (ref 101–111)
EOSINOPHIL # BLD AUTO: 0.3 10^3/UL (ref 0–0.6)
GLOBULIN SER CALC-MCNC: 3.5 G/DL (ref 2–4)
GLUCOSE SERPL-MCNC: 106 MG/DL (ref 70–100)
HCO3 SERPL-SCNC: 26 MMOL/L (ref 22–32)
HCT VFR BLD AUTO: 40 % (ref 35–47)
HGB BLD-MCNC: 13.7 G/DL (ref 12–16)
INR PPP/BLD: 1.31 (ref 0.82–1.09)
LYMPHOCYTES # BLD AUTO: 2.1 10^3/UL (ref 1–4.8)
MAGNESIUM SERPL-MCNC: 1.7 MG/DL (ref 1.9–2.7)
MCH RBC QN AUTO: 31 PG (ref 27–31)
MCHC RBC AUTO-ENTMCNC: 35 G/DL (ref 31–36)
MCV RBC AUTO: 89 FL (ref 80–97)
MONOCYTES # BLD AUTO: 0.7 10^3/UL (ref 0–0.8)
NEUTROPHILS # BLD AUTO: 4.8 10^3/UL (ref 1.5–7.7)
NRBC # BLD AUTO: 0 10^3/UL
NRBC BLD QL AUTO: 0
PLATELET # BLD AUTO: 194 10^3/UL (ref 150–450)
POTASSIUM SERPL-SCNC: 3.7 MMOL/L (ref 3.5–5)
PROT SERPL-MCNC: 7.4 G/DL (ref 6.4–8.9)
RBC # BLD AUTO: 4.44 10^6 /UL (ref 3.7–4.87)
SODIUM SERPL-SCNC: 132 MMOL/L (ref 135–145)
TROPONIN I SERPL-MCNC: 0 NG/ML (ref ?–0.03)
WBC # BLD AUTO: 7.9 10^3/UL (ref 3.5–10.8)

## 2020-04-23 NOTE — ED
Hypertension





- HPI Summary


HPI Summary: 


84-year-old female presenting to [facility] with a chief complaint of elevated blood 

pressure for the last week. She reports that she has had high blood pressure 

readings for the last week despite being on Amlodipine and Metoprolol already. 

Two days ago, she spoke with her PCP who placed her on a third medication, 

Irbesartan. She took all three medications in the morning yesterday and had a 

normal reading. Last night, however, she did not take the second dose of 

Metoprolol and had a reading of 200+ systolic, warranting concern for 

presentation this morning. She denies any CP, SOB, nausea, or weakness/numbness/

tingling in the extremities. She endorses intermittent quick episodes of 

blurred vision throughout the week since her blood pressure has been elevated. 

She rates her symptoms 5/10 in severity. She is currently on Eliquis for blood 

clots, which she has been taking for about a year. Past medical history mitral 

valve replacement, GERD, arthritis, TIA, bilateral cataract surgery. Nonsmoker. 

Weekly alcohol use. No substance use. Medications reviewed. Allergies noted.





- History of Current Complaint


Chief Complaint: EDHypertension


Stated Complaint: HIGH BLOOD PRESSURE PER PT


Time Seen by Provider: 04/23/20 00:56


Hx Obtained From: Patient


Onset/Duration: Started Days Ago, Still Present


Timing: Intermittent


Reported Blood Pressure Prior To Arrival: 200+ systolic


Aggravating Factor(s): Nothing


Alleviating Factor(s): Nothing


Associated Signs & Symptoms: Vision Changes - blurred intertmittently





- Allergies/Home Medications


Allergies/Adverse Reactions: 


 Allergies











Allergy/AdvReac Type Severity Reaction Status Date / Time


 


codeine Allergy  Rash Verified 04/22/20 21:20


 


latex AdvReac  Rash Verified 04/22/20 21:20











Home Medications: 


 Home Medications





Hydrochlorothiazide TAB* [Hydrodiuril TAB*] 25 mg PO QAM 04/30/18 [History 

Confirmed 04/22/20]


Omeprazole CAP (NF) [Prilosec CAP* 20 MG] 20 mg PO DAILY 04/30/18 [History 

Confirmed 04/22/20]


Acetaminophen TAB* [Tylenol TAB*] 650 mg PO Q4H PRN  tab 05/12/18 [Rx Confirmed 

04/22/20]


Apixaban* [Eliquis*] 5 mg PO BID 09/17/19 [History Confirmed 04/22/20]


Cetirizine* [ZyrTEC 10 MG TAB*] 10 mg PO QPM 09/17/19 [History Confirmed 04/22/ 20]


Metoprolol Tartrate TAB* [Lopressor TAB*] 100 mg PO BID 09/17/19 [History 

Confirmed 04/22/20]


Raloxifene (NF) [Evista(NF)] 60 mg PO DAILY 09/17/19 [History Confirmed 04/22/20

]


Amlodipine Besylate [Norvasc] 5 mg PO QAM 04/22/20 [History Confirmed 04/22/20]


Irbesartan 1 tab PO QAM 04/22/20 [History Confirmed 04/22/20]











PMH/Surg Hx/FS Hx/Imm Hx


Endocrine/Hematology History: 


   Denies: Hx Diabetes


Cardiovascular History: Reports: Hx Deep Vein Thrombosis, Hx Hypertension, Hx 

Valvular Heart Disease - mitral valve replacement


   Denies: Hx Pacemaker/ICD


Respiratory History: Reports: Other Respiratory Problems/Disorders - sinusitis


GI History: Reports: Hx Gastroesophageal Reflux Disease


   Denies: Other GI Disorders


 History: 


   Denies: Hx Renal Disease, Other  Problems/Disorders


Musculoskeletal History: Reports: Hx Arthritis - left knee left thumb


   Denies: Hx Osteoporosis, Other Musculoskeletal History


Sensory History: Reports: Hx Cataracts - vickie, Hx Contacts or Glasses - glasses


   Denies: Hx Deafness, Hx Hearing Aid


Opthamlomology History: Reports: Hx Cataracts - vickie, Hx Contacts or Glasses - 

glasses


Neurological History: Reports: Hx Transient Ischemic Attacks (TIA)


   Denies: Other Neuro Impairments/Disorders


Psychiatric History: 


   Denies: Hx Panic Disorder, Other Psychiatric Issues/Disorders





- Cancer History


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


Hx Chemotherapy: No


Hx Radiation Therapy: No


Hx Palliative Cancer Treatment: No





- Surgical History


Surgical History: Yes


Surgery Procedure, Year, and Place: TONSILS AS CHILD.  MITRAL VALVE REPAIR-PT 

HAS CARDS THAT STATE MRI SAFE NO METAL 2014.  TUBAL LIGATION.  BILATERAL 

CATARACT SURGERY


Hx Anesthesia Reactions: No





- Immunization History


Date of Tetanus Vaccine: 9/10/19


Infectious Disease History: No


Infectious Disease History: 


   Denies: Traveled Outside the [place] in Last 30 Days





- Family History


Known Family History: Positive: Hypertension





- Social History


Alcohol Use: Weekly


Alcohol Amount: 3 per week


Hx Substance Use: No


Substance Use Type: Reports: None


Hx Tobacco Use: No


Smoking Status (MU): Never Smoked Tobacco





- Additional Comments


History Additional Comments: 


hypertension, DVT on Eliquis, mitral valve replacement, GERD, arthritis, TIA, 

bilateral cataract surgery





Review of Systems





- ROS Summary


Review of Systems Summary: 


 Home Medications











 Medication  Instructions  Recorded  Confirmed  Type


 


Hydrochlorothiazide TAB* 25 mg PO QAM 04/30/18 04/22/20 History





[Hydrodiuril TAB*]    


 


Omeprazole CAP (NF) [Prilosec CAP* 20 mg PO DAILY 04/30/18 04/22/20 History





20 MG]    


 


Acetaminophen TAB* [Tylenol TAB*] 650 mg PO Q4H PRN  tab 05/12/18 04/22/20 Rx


 


Apixaban* [Eliquis*] 5 mg PO BID 09/17/19 04/22/20 History


 


Cetirizine* [ZyrTEC 10 MG TAB*] 10 mg PO QPM 09/17/19 04/22/20 History


 


Metoprolol Tartrate TAB* 100 mg PO BID 09/17/19 04/22/20 History





[Lopressor TAB*]    


 


Raloxifene (NF) [Evista(NF)] 60 mg PO DAILY 09/17/19 04/22/20 History


 


Amlodipine Besylate [Norvasc] 5 mg PO QAM 04/22/20 04/22/20 History


 


Irbesartan 1 tab PO QAM 04/22/20 04/22/20 History








Positive: Blurred Vision - intermittent


Negative: Chest Pain


Negative: Shortness Of Breath


Negative: Weakness, Paresthesia, Numbness


All Other Systems Reviewed And Are Negative: Yes





Physical Exam





- Summary


Physical Exam Summary: 


General: Well-developed, Well-nourished female. No acute distress.


HEENT: Normocephalic, Atraumatic. 


              Eyes: Conjuctiva normal, PERRL.


              Oropharynx: Clear, mucous membranes moist, (-) exudates. 


Neck: Soft, FROM, (-) lymphadenopathy, (-) thyromegaly, (-) JVD.


Cardiovascular: Normal sinus rhythm, (-) murmur.


Lungs: Clear to auscultation bilaterally (-) wheezes, (-) rales, (-) rhonchi.


Abdomen: Soft, non-tender, non-distended, (-) organomegaly, normal bowel sounds.


Back: (-) CVA tenderness


Extremities: No edema.


Skin: Warm, dry, (-) rash.


Neuro: Alert and oriented x3, moves all extremities equally. No ataxia. No gait 

disturbance. No sensory deficit. Normal strength, normal sensation. 


Psychiatric: Mood normal, affect normal.


Triage Information Reviewed: Yes


Vital Signs On Initial Exam: 


 Initial Vitals











Temp Pulse Resp BP Pulse Ox


 


 96.9 F   70   18   235/82   99 


 


 04/22/20 21:18  04/22/20 21:18  04/22/20 21:18  04/22/20 21:18  04/22/20 21:18











Vital Signs Reviewed: Yes





Procedures





- Sedation


Patient Received Moderate/Deep Sedation with Procedure: No





Diagnostics





- Vital Signs


 Vital Signs











  Temp Pulse Resp BP Pulse Ox


 


 04/23/20 00:48   69   195/77  97


 


 04/23/20 00:46   69    95


 


 04/22/20 23:22  97.7 F  68  16  160/65  97


 


 04/22/20 21:18  96.9 F  70  18  235/82  99














- Laboratory


Result Diagrams: 


 04/23/20 01:48





 04/23/20 01:48


Lab Statement: Any lab studies that have been ordered have been reviewed, and 

results considered in the medical decision making process.





- EKG


  ** 0136


Cardiac Rate: NL - 64 BPM


EKG Rhythm: Sinus Rhythm


Summary of EKG Findings: EKG at 0136 reveals normal sinus rhythm with rate of 

64 BPM, no acute changes, no ischemic changes. This EKG was reviewed and 

interpreted by Dr. Vail.





Re-Evaluation





- Re-Evaluation


  ** First Eval


Re-Evaluation Time: 02:30


Change: Improved


Comment: BP improved to 182/79. Patient is safe for discharge. All results 

discussed.





Hypertension Course/Dx





- Course


Course Of Treatment: 84-year-old female presents from home with headache and 

elevated blood pressures.  Patient states she has been having elevated blood 

pressures all week.  Just received a new medication from her PCP today.  Today 

was her first dose.  She has not taken her evening metoprolol.  She states when 

she gets headache she knows her blood pressure is high.  Tonight it was over 

200 systolic she became very concerned about having a stroke.  Patient denies 

any fevers or chills.  No chest pain, cough, shortness of breath.  No nausea 

vomiting.  No urinary complaints.  On physical exam no significant 

abnormalities.  Other than blood pressure 235/82 automatic cuff.  Workup 

demonstrates no sig significant abnormalities other than slightly elevated 

glucose.  Normal troponin and no changes on EKG.  Patient is given her evening 

dose of Lopressor.  Resting quietly on monitor.  Blood pressure comes down 

nicely.  182/79 upon discharge.  Patient was discharged home.  Advised her to 

continue taking the new medication for her blood pressure as well as her other 

2.  Follow up with PCP.  Follow-up sooner for any worsening symptoms.





- Diagnoses


Provider Diagnoses: 


 Hypertension, Headache








- Critical Care Time


Critical Care Statement: Critical care time is provided exclusive of any time 

spent performing procedures.





Discharge ED





- Sign-Out/Discharge


Documenting (check all that apply): Patient Departure - Patient will be 

discharged home.





- Discharge Plan


Condition: Stable


Disposition: HOME


Patient Education Materials:  Chronic Hypertension (DC)


Referrals: 


Parvin Bazzi MD [Primary Care Provider] - 3 Days


Additional Instructions: 


Please continue to take your medications as prescribed. Follow up with your 

primary care provider in 2-3 days. Return to the emergency department for any 

new or worsening symptoms.





- Billing Disposition and Condition


Condition: STABLE


Disposition: Home





- Attestation Statements


Document Initiated by Georgeibe: Yes


Documenting Scribe: Josefina Abad


Provider For Whom Bola is Documenting (Include Credential): Winsoem Vail MD


Scribe Attestation: 


IJosefina, scribed for Winsome Vail MD on 04/23/20 at 0441. 


Scribe Documentation Reviewed: Yes


Provider Attestation: 


The documentation as recorded by the Josefina mosley accurately reflects 

the service I personally performed and the decisions made by me, Winsome Vail MD


Status of Scribe Document: Viewed